# Patient Record
Sex: MALE | Race: WHITE | NOT HISPANIC OR LATINO | URBAN - METROPOLITAN AREA
[De-identification: names, ages, dates, MRNs, and addresses within clinical notes are randomized per-mention and may not be internally consistent; named-entity substitution may affect disease eponyms.]

---

## 2018-05-11 ENCOUNTER — EMERGENCY (EMERGENCY)
Facility: HOSPITAL | Age: 63
LOS: 1 days | Discharge: HOME | End: 2018-05-11
Attending: EMERGENCY MEDICINE | Admitting: EMERGENCY MEDICINE
Payer: COMMERCIAL

## 2018-05-11 VITALS
HEART RATE: 83 BPM | DIASTOLIC BLOOD PRESSURE: 91 MMHG | OXYGEN SATURATION: 96 % | TEMPERATURE: 98 F | SYSTOLIC BLOOD PRESSURE: 151 MMHG | RESPIRATION RATE: 18 BRPM

## 2018-05-11 VITALS — WEIGHT: 188.5 LBS

## 2018-05-11 PROBLEM — Z00.00 ENCOUNTER FOR PREVENTIVE HEALTH EXAMINATION: Status: ACTIVE | Noted: 2018-05-11

## 2018-05-11 LAB
ALBUMIN SERPL ELPH-MCNC: 4.5 G/DL — SIGNIFICANT CHANGE UP (ref 3.3–5)
ALP SERPL-CCNC: 65 U/L — SIGNIFICANT CHANGE UP (ref 40–120)
ALT FLD-CCNC: 25 U/L — SIGNIFICANT CHANGE UP (ref 10–45)
ANION GAP SERPL CALC-SCNC: 14 MMOL/L — SIGNIFICANT CHANGE UP (ref 5–17)
APTT BLD: 29.6 SEC — SIGNIFICANT CHANGE UP (ref 27.5–37.4)
AST SERPL-CCNC: 26 U/L — SIGNIFICANT CHANGE UP (ref 10–40)
BASOPHILS # BLD AUTO: 0.1 K/UL — SIGNIFICANT CHANGE UP (ref 0–0.2)
BASOPHILS NFR BLD AUTO: 1.2 % — SIGNIFICANT CHANGE UP (ref 0–2)
BILIRUB SERPL-MCNC: 1 MG/DL — SIGNIFICANT CHANGE UP (ref 0.2–1.2)
BUN SERPL-MCNC: 21 MG/DL — SIGNIFICANT CHANGE UP (ref 7–23)
CALCIUM SERPL-MCNC: 9.6 MG/DL — SIGNIFICANT CHANGE UP (ref 8.4–10.5)
CHLORIDE SERPL-SCNC: 103 MMOL/L — SIGNIFICANT CHANGE UP (ref 96–108)
CK SERPL-CCNC: 167 U/L — SIGNIFICANT CHANGE UP (ref 30–200)
CO2 SERPL-SCNC: 24 MMOL/L — SIGNIFICANT CHANGE UP (ref 22–31)
CREAT SERPL-MCNC: 1.06 MG/DL — SIGNIFICANT CHANGE UP (ref 0.5–1.3)
EOSINOPHIL # BLD AUTO: 0.2 K/UL — SIGNIFICANT CHANGE UP (ref 0–0.5)
EOSINOPHIL NFR BLD AUTO: 2 % — SIGNIFICANT CHANGE UP (ref 0–6)
GLUCOSE BLDC GLUCOMTR-MCNC: 110 MG/DL — HIGH (ref 70–99)
GLUCOSE SERPL-MCNC: 100 MG/DL — HIGH (ref 70–99)
HCT VFR BLD CALC: 48.1 % — SIGNIFICANT CHANGE UP (ref 39–50)
HGB BLD-MCNC: 16.3 G/DL — SIGNIFICANT CHANGE UP (ref 13–17)
INR BLD: 0.99 RATIO — SIGNIFICANT CHANGE UP (ref 0.88–1.16)
LYMPHOCYTES # BLD AUTO: 3.2 K/UL — SIGNIFICANT CHANGE UP (ref 1–3.3)
LYMPHOCYTES # BLD AUTO: 33.2 % — SIGNIFICANT CHANGE UP (ref 13–44)
MCHC RBC-ENTMCNC: 30.5 PG — SIGNIFICANT CHANGE UP (ref 27–34)
MCHC RBC-ENTMCNC: 34 GM/DL — SIGNIFICANT CHANGE UP (ref 32–36)
MCV RBC AUTO: 89.8 FL — SIGNIFICANT CHANGE UP (ref 80–100)
MONOCYTES # BLD AUTO: 0.7 K/UL — SIGNIFICANT CHANGE UP (ref 0–0.9)
MONOCYTES NFR BLD AUTO: 7.2 % — SIGNIFICANT CHANGE UP (ref 2–14)
NEUTROPHILS # BLD AUTO: 5.5 K/UL — SIGNIFICANT CHANGE UP (ref 1.8–7.4)
NEUTROPHILS NFR BLD AUTO: 56.5 % — SIGNIFICANT CHANGE UP (ref 43–77)
PLATELET # BLD AUTO: 151 K/UL — SIGNIFICANT CHANGE UP (ref 150–400)
POTASSIUM SERPL-MCNC: 4.7 MMOL/L — SIGNIFICANT CHANGE UP (ref 3.5–5.3)
POTASSIUM SERPL-SCNC: 4.7 MMOL/L — SIGNIFICANT CHANGE UP (ref 3.5–5.3)
PROT SERPL-MCNC: 7.4 G/DL — SIGNIFICANT CHANGE UP (ref 6–8.3)
PROTHROM AB SERPL-ACNC: 10.8 SEC — SIGNIFICANT CHANGE UP (ref 9.8–12.7)
RBC # BLD: 5.36 M/UL — SIGNIFICANT CHANGE UP (ref 4.2–5.8)
RBC # FLD: 12.1 % — SIGNIFICANT CHANGE UP (ref 10.3–14.5)
SODIUM SERPL-SCNC: 141 MMOL/L — SIGNIFICANT CHANGE UP (ref 135–145)
TROPONIN T SERPL-MCNC: <0.01 NG/ML — SIGNIFICANT CHANGE UP (ref 0–0.06)
WBC # BLD: 9.8 K/UL — SIGNIFICANT CHANGE UP (ref 3.8–10.5)
WBC # FLD AUTO: 9.8 K/UL — SIGNIFICANT CHANGE UP (ref 3.8–10.5)

## 2018-05-11 PROCEDURE — 80053 COMPREHEN METABOLIC PANEL: CPT

## 2018-05-11 PROCEDURE — 70450 CT HEAD/BRAIN W/O DYE: CPT

## 2018-05-11 PROCEDURE — 82962 GLUCOSE BLOOD TEST: CPT

## 2018-05-11 PROCEDURE — 85730 THROMBOPLASTIN TIME PARTIAL: CPT

## 2018-05-11 PROCEDURE — 85610 PROTHROMBIN TIME: CPT

## 2018-05-11 PROCEDURE — 99285 EMERGENCY DEPT VISIT HI MDM: CPT | Mod: 25

## 2018-05-11 PROCEDURE — 93005 ELECTROCARDIOGRAM TRACING: CPT

## 2018-05-11 PROCEDURE — 93010 ELECTROCARDIOGRAM REPORT: CPT

## 2018-05-11 PROCEDURE — 85027 COMPLETE CBC AUTOMATED: CPT

## 2018-05-11 PROCEDURE — 84484 ASSAY OF TROPONIN QUANT: CPT

## 2018-05-11 PROCEDURE — 99284 EMERGENCY DEPT VISIT MOD MDM: CPT | Mod: 25

## 2018-05-11 PROCEDURE — 70450 CT HEAD/BRAIN W/O DYE: CPT | Mod: 26

## 2018-05-11 PROCEDURE — 82550 ASSAY OF CK (CPK): CPT

## 2018-05-11 NOTE — ED PROVIDER NOTE - MEDICAL DECISION MAKING DETAILS
Jonathan Weil, PGY1 - Code stroke called based on history of unilateral numbness with dysarthria. No deficits on exam. CT head negative. Neurology at bedside to evaluate - agree with no focal deficits. Further history reveals history of similar symptoms for many years with no h/o cerebral ischemia.

## 2018-05-11 NOTE — ED PROVIDER NOTE - OBJECTIVE STATEMENT
62M presenting with numbness for 3 hours. Patient reports abrupt onset of numbness and tingling in the L face and L leg at approximately 2130 this evening. Associated w/ dysarthria. No weakness/headache/vision change/gait change. Hx similar symptoms for many years. Patient is not on asa or anticoagulants.

## 2018-05-11 NOTE — ED ADULT NURSE NOTE - OBJECTIVE STATEMENT
Received patient awake and alert x 4, presenting to the ED with numbness for 3 hours, states the numbness starting abruptly at 2130 this evening, numbness to left face and left leg along with dysarthria. States he had no weakness, no headache, no visual changes. No facial droop noted. Upper and lower extremities strong with coherent speech. Code stroke initiated, MD and neurology at bedside with no focal deficits noted. Patient states he has had similar symptoms for many years, family at bedside, safety and comfort measures maintained, will continue to monitor.

## 2018-05-11 NOTE — ED PROVIDER NOTE - PROGRESS NOTE DETAILS
Jonathan Weil, PGY1 - neuro recommends no intervention at this time. If rest of evaluation is negative patient can f/u outpatient.

## 2018-05-11 NOTE — CONSULT NOTE ADULT - SUBJECTIVE AND OBJECTIVE BOX
HUMBERTO Ball is a 62y old  Male who presents with a chief complaint of left sided numbness    HPI:  64 y/o [place] man with hx of HLD, former smoker p/w sudden onset of left sided numbness and slurred speech at home at around 9:30pm. Pt was at home alone at the time. Lasted about 20 minutes improved, but pt states he did not get back to 100%. Then at about 10:30pm the symptoms came back again. That when he called his daughter and they decided to come to the ED. Code stroke was called. Pt states that he gets these symptoms on and off since he was a teenager and was told in the past that it could be related to stress. Tonight he felt that he was stronger than usual. He denies any HA, weakness, chest pain, SOB, palpitations. Symptoms have now completely resolved. Daughter agrees.     MEDICATIONS  (STANDING):    MEDICATIONS  (PRN):    PAST MEDICAL & SURGICAL HISTORY:  HLD    FAMILY HISTORY: No significant    Allergies    No Known Allergies    Intolerances    SHx - Former smoker for 40 yrs, quit 10 yrs ago, No ETOH, No drug abuse      REVIEW OF SYSTEMS:    Constitutional: No fever, weight loss, or fatigue  Eyes: No eye pain, visual disturbances, or discharge  ENMT:  No difficulty hearing, tinnitus, vertigo; No sinus or throat pain  Neck: No pain or stiffness  Respiratory: No cough, wheezing, or shortness of breath  Cardiovascular: No chest pain, palpitations, or leg swelling  Gastrointestinal: No abdominal pain, nausea, vomiting, diarrhea or constipation.   Genitourinary: No dysuria, frequency, hematuria, or incontinence  Neurological: As per HPI  Skin: No itching, burning, rashes, or lesions   Endocrine: No heat or cold intolerance; No hair loss  Musculoskeletal: No joint pain or swelling; No muscle, back, or extremity pain  Psychiatric: No depression, anxiety, mood swings, or difficulty sleeping  Heme/Lymph: No easy bruising or bleeding; No enlarged glands      Vital Signs Last 24 Hrs  T(C): 37 (11 May 2018 01:07), Max: 37 (11 May 2018 01:07)  T(F): 98.6 (11 May 2018 01:07), Max: 98.6 (11 May 2018 01:07)  HR: 79 (11 May 2018 01:18) (79 - 83)  BP: 115/86 (11 May 2018 01:18) (115/86 - 151/91)  BP(mean): --  RR: 18 (11 May 2018 01:18) (16 - 18)  SpO2: 98% (11 May 2018 01:18) (96% - 98%)    General Exam:   General appearance: No acute distress    Cardiac:  Pulm:                 Neurological Exam:  Mental Status: Orientated to self, date and place.  Attention intact.  No dysarthria. Speech fluent. naming intact  Cranial Nerves:   PERRL, EOMI, VFF, no nystagmus.    CN V1-3 intact to light touch .  No facial asymmetry.  Hearing intact bilaterally.  Tongue  midline.  Sternocleidomastoid and Trapezius intact bilaterally.    Motor:   Tone: normal.                  Strength:     [] Upper extremity                      Delt       Bicep    Tricep                                                  R         5/5        5/5        5/5       5/5                                               L          5/5        5/5        5/5       5/5  [] Lower extremity                       HF          KE          KF        DF         PF                                               R        5/5        5/5        5/5       5/5       5/5                                               L         5/5        5/5       5/5       5/5        5/5             Dysmetria: None to finger-nose-finger or heel-shin-heel  No truncal ataxia.    Tremor: No resting, postural or action tremor.  No myoclonus.    Sensation: intact to light touch, pinprick,     Gait: normal.      Other:    05-11    141  |  103  |  21  ----------------------------<  100<H>  4.7   |  24  |  1.06    Ca    9.6      11 May 2018 01:22    TPro  7.4  /  Alb  4.5  /  TBili  1.0  /  DBili  x   /  AST  26  /  ALT  25  /  AlkPhos  65  05-11                            16.3   9.8   )-----------( 151      ( 11 May 2018 01:22 )             48.1       Radiology    CT: < from: CT Brain Stroke Protocol (05.11.18 @ 01:21) >  IMPRESSION:   No acute hemorrhage, mass effect, or shift. No CT evidence of an acute   territorial infarct. Small chronic left parietal lobe infarcts.

## 2018-05-11 NOTE — CONSULT NOTE ADULT - ASSESSMENT
64 y/o man with hx of smoking and HLD p/w left sided numbness and dysarthria since 9:30pm which waxed and waned until now. Symptoms have now currently fully resolved, NIHSS 0, MRS 0. Pt reports personal hx of similar events in the past.     Impression: Possible TIA    Plan: Pt to follow up with Dr. Darnell as outpatient [phone], start daily Aspirin. 62 y/o man with hx of smoking and HLD p/w left sided numbness and dysarthria since 9:30pm which waxed and waned until now. Symptoms have now currently fully resolved, NIHSS 0, MRS 0. Pt reports personal hx of similar events in the past. Pt not TPA candidate given that symptoms completely resolved.     Impression: Possible TIA    Plan: Pt to follow up with Dr. Darnell as outpatient [phone], start daily Aspirin.

## 2018-05-11 NOTE — ED PROVIDER NOTE - ATTENDING CONTRIBUTION TO CARE
62 yom pmhx hld, former smoker, presents with numbness/ tingling to left side of face  associated w slurred speech starting at approx 930 pm. states sxs lasted 30 min but still states does not feel 100 percent back to normal. no headache, no cp or sob. pt states the sxs returned again at 1030 pm and have not mostly resolved but still does not feel "back to normal."     ROS:   constitutional - no fever, no chills  eyes - no visual changes, no redness  eent - no sore throat, no nasal congestion  cvs - no chest pain, no leg swelling  resp - no shortness of breath, no cough  gi - no abdominal pain, no vomiting, no diarrhea  gu - no dysuria, no hematuria  msk - no acute back pain, no joint swelling  skin - no rashes, no jaundice  neuro - no headache, no focal weakness  psych - no acute mental health issue     Physical Exam:   constitutional - well appearing, awake and alert, oriented x3  head - no external evidence of trauma  cvs - rrr, no murmurs, no peripheral edema  resp - breath sounds clear and equal bilat  gi - abdomen soft and nontender, no rigidity, guarding or rebound, bowel sounds present  msk - moving all extremities spontaneously  neuro - alert and oriented x3, no focal deficits, CNs 2-12 grossly intact, no pronator drift. reports subjective mild left facial numbness. strength 5/5 in all ext. gait stable  skin- no jaundice, warm and dry  psych - mood and affect wnl, no apparent risk to self or others     seen by neuro in ed - upon further questioning pt does state he has had similar sxs recurrently since teenage years. possible tia - as per neuro recs will start daily baby asa and f/u w neuro as outpt. sxs completely resolved inED. additional verbal instructions regarding diagnosis, return precautions and follow up plan given to pt and/or family. EDITH King MD

## 2021-02-20 ENCOUNTER — EMERGENCY (EMERGENCY)
Facility: HOSPITAL | Age: 66
LOS: 1 days | Discharge: HOME | End: 2021-02-20
Attending: EMERGENCY MEDICINE
Payer: COMMERCIAL

## 2021-02-20 VITALS
HEART RATE: 78 BPM | HEIGHT: 67 IN | DIASTOLIC BLOOD PRESSURE: 85 MMHG | OXYGEN SATURATION: 98 % | SYSTOLIC BLOOD PRESSURE: 145 MMHG | RESPIRATION RATE: 18 BRPM | TEMPERATURE: 99 F | WEIGHT: 175.05 LBS

## 2021-02-20 PROBLEM — E78.5 HYPERLIPIDEMIA, UNSPECIFIED: Chronic | Status: ACTIVE | Noted: 2018-05-14

## 2021-02-20 LAB
ALBUMIN SERPL ELPH-MCNC: 4.5 G/DL — SIGNIFICANT CHANGE UP (ref 3.3–5)
ALP SERPL-CCNC: 73 U/L — SIGNIFICANT CHANGE UP (ref 40–120)
ALT FLD-CCNC: 17 U/L — SIGNIFICANT CHANGE UP (ref 10–45)
ANION GAP SERPL CALC-SCNC: 11 MMOL/L — SIGNIFICANT CHANGE UP (ref 5–17)
APTT BLD: 26.7 SEC — LOW (ref 27.5–35.5)
AST SERPL-CCNC: 23 U/L — SIGNIFICANT CHANGE UP (ref 10–40)
BASOPHILS # BLD AUTO: 0.06 K/UL — SIGNIFICANT CHANGE UP (ref 0–0.2)
BASOPHILS NFR BLD AUTO: 0.7 % — SIGNIFICANT CHANGE UP (ref 0–2)
BILIRUB SERPL-MCNC: 0.7 MG/DL — SIGNIFICANT CHANGE UP (ref 0.2–1.2)
BUN SERPL-MCNC: 20 MG/DL — SIGNIFICANT CHANGE UP (ref 7–23)
CALCIUM SERPL-MCNC: 9.8 MG/DL — SIGNIFICANT CHANGE UP (ref 8.4–10.5)
CHLORIDE SERPL-SCNC: 106 MMOL/L — SIGNIFICANT CHANGE UP (ref 96–108)
CO2 SERPL-SCNC: 27 MMOL/L — SIGNIFICANT CHANGE UP (ref 22–31)
CREAT SERPL-MCNC: 1.07 MG/DL — SIGNIFICANT CHANGE UP (ref 0.5–1.3)
EOSINOPHIL # BLD AUTO: 0.12 K/UL — SIGNIFICANT CHANGE UP (ref 0–0.5)
EOSINOPHIL NFR BLD AUTO: 1.4 % — SIGNIFICANT CHANGE UP (ref 0–6)
GLUCOSE SERPL-MCNC: 113 MG/DL — HIGH (ref 70–99)
HCT VFR BLD CALC: 47.3 % — SIGNIFICANT CHANGE UP (ref 39–50)
HGB BLD-MCNC: 16.1 G/DL — SIGNIFICANT CHANGE UP (ref 13–17)
IMM GRANULOCYTES NFR BLD AUTO: 0.3 % — SIGNIFICANT CHANGE UP (ref 0–1.5)
INR BLD: 0.99 RATIO — SIGNIFICANT CHANGE UP (ref 0.88–1.16)
LYMPHOCYTES # BLD AUTO: 2.2 K/UL — SIGNIFICANT CHANGE UP (ref 1–3.3)
LYMPHOCYTES # BLD AUTO: 25.5 % — SIGNIFICANT CHANGE UP (ref 13–44)
MAGNESIUM SERPL-MCNC: 2.1 MG/DL — SIGNIFICANT CHANGE UP (ref 1.6–2.6)
MCHC RBC-ENTMCNC: 29.9 PG — SIGNIFICANT CHANGE UP (ref 27–34)
MCHC RBC-ENTMCNC: 34 GM/DL — SIGNIFICANT CHANGE UP (ref 32–36)
MCV RBC AUTO: 87.9 FL — SIGNIFICANT CHANGE UP (ref 80–100)
MONOCYTES # BLD AUTO: 0.5 K/UL — SIGNIFICANT CHANGE UP (ref 0–0.9)
MONOCYTES NFR BLD AUTO: 5.8 % — SIGNIFICANT CHANGE UP (ref 2–14)
NEUTROPHILS # BLD AUTO: 5.73 K/UL — SIGNIFICANT CHANGE UP (ref 1.8–7.4)
NEUTROPHILS NFR BLD AUTO: 66.3 % — SIGNIFICANT CHANGE UP (ref 43–77)
NRBC # BLD: 0 /100 WBCS — SIGNIFICANT CHANGE UP (ref 0–0)
PLATELET # BLD AUTO: 145 K/UL — LOW (ref 150–400)
POTASSIUM SERPL-MCNC: 4 MMOL/L — SIGNIFICANT CHANGE UP (ref 3.5–5.3)
POTASSIUM SERPL-SCNC: 4 MMOL/L — SIGNIFICANT CHANGE UP (ref 3.5–5.3)
PROT SERPL-MCNC: 7.6 G/DL — SIGNIFICANT CHANGE UP (ref 6–8.3)
PROTHROM AB SERPL-ACNC: 11.9 SEC — SIGNIFICANT CHANGE UP (ref 10.6–13.6)
RBC # BLD: 5.38 M/UL — SIGNIFICANT CHANGE UP (ref 4.2–5.8)
RBC # FLD: 13.8 % — SIGNIFICANT CHANGE UP (ref 10.3–14.5)
SARS-COV-2 RNA SPEC QL NAA+PROBE: SIGNIFICANT CHANGE UP
SODIUM SERPL-SCNC: 144 MMOL/L — SIGNIFICANT CHANGE UP (ref 135–145)
WBC # BLD: 8.64 K/UL — SIGNIFICANT CHANGE UP (ref 3.8–10.5)
WBC # FLD AUTO: 8.64 K/UL — SIGNIFICANT CHANGE UP (ref 3.8–10.5)

## 2021-02-20 PROCEDURE — 70551 MRI BRAIN STEM W/O DYE: CPT | Mod: 26,MG

## 2021-02-20 PROCEDURE — G1004: CPT

## 2021-02-20 PROCEDURE — 70498 CT ANGIOGRAPHY NECK: CPT | Mod: 26,MA

## 2021-02-20 PROCEDURE — 70496 CT ANGIOGRAPHY HEAD: CPT | Mod: 26,MA

## 2021-02-20 PROCEDURE — 99218: CPT

## 2021-02-20 PROCEDURE — 93010 ELECTROCARDIOGRAM REPORT: CPT

## 2021-02-20 RX ORDER — MECLIZINE HCL 12.5 MG
25 TABLET ORAL ONCE
Refills: 0 | Status: COMPLETED | OUTPATIENT
Start: 2021-02-20 | End: 2021-02-20

## 2021-02-20 RX ORDER — SODIUM CHLORIDE 9 MG/ML
3 INJECTION INTRAMUSCULAR; INTRAVENOUS; SUBCUTANEOUS EVERY 8 HOURS
Refills: 0 | Status: DISCONTINUED | OUTPATIENT
Start: 2021-02-20 | End: 2021-02-23

## 2021-02-20 RX ORDER — ATORVASTATIN CALCIUM 80 MG/1
40 TABLET, FILM COATED ORAL AT BEDTIME
Refills: 0 | Status: DISCONTINUED | OUTPATIENT
Start: 2021-02-20 | End: 2021-02-23

## 2021-02-20 RX ADMIN — ATORVASTATIN CALCIUM 40 MILLIGRAM(S): 80 TABLET, FILM COATED ORAL at 19:44

## 2021-02-20 RX ADMIN — Medication 25 MILLIGRAM(S): at 09:30

## 2021-02-20 RX ADMIN — SODIUM CHLORIDE 3 MILLILITER(S): 9 INJECTION INTRAMUSCULAR; INTRAVENOUS; SUBCUTANEOUS at 22:41
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
## 2021-02-20 NOTE — ED ADULT NURSE NOTE - OBJECTIVE STATEMENT
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

## 2021-02-20 NOTE — ED CDU PROVIDER INITIAL DAY NOTE - DETAILS
vital signs q4h, neuro checks q4h, MRI, TTE, neuro consult, frequent re-evaluations  case d/w Dr. Kasper
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
## 2021-02-20 NOTE — ED ADULT NURSE REASSESSMENT NOTE - NS ED NURSE REASSESS COMMENT FT1
Care assumed of patient at this time. Patient is at CT scan
Patient returns from CT. Patient updated to plan of care.
Patient updated to plan of care. Patient's family member updated to plan of care via telephone by doctor Farrar
Received pt from JAY Cunningham , received pt alert and responsive, oriented x4, denies any respiratory distress, SOB, or difficulty breathing. Pt transferred to CDU for MRI and TTE. Pt neuro exam intact no deficits noted. Pt denies pain or discomfort at this time. On telemetry pt is SR hr: 70's.  IV in place, patent and free of signs of infiltration, V/S stable, pt afebrile. Pt educated on unit and unit rules, instructed patient to notify RN of any needed assistance, Pt verbalizes understanding, Call bell placed within reach. Safety maintained. Will continue to monitor.

## 2021-02-20 NOTE — ED CDU PROVIDER SUBSEQUENT DAY NOTE - PHYSICAL EXAMINATION
CONSTITUTIONAL: Patient is awake, alert and oriented x 3. Patient is well appearing and in no acute distress  HEAD: NCAT  NECK: supple, FROM  LUNGS: CTA B/  HEART: RRR  ABDOMEN: Soft nd/nt, no rebound or guarding  EXTREMITY: no edema or calf tenderness b/l, FROM upper and lower ext b/l  SKIN: with no rash or lesions  NEURO: Cn3-12 grossly intact. Strength5/5UE/LE.NmlSensation.Gait normal

## 2021-02-20 NOTE — ED ADULT TRIAGE NOTE - CHIEF COMPLAINT QUOTE
pain in right side of neck and feeling unbalanced when walking "since yesterday afternoon when walking up stairs." "feels better this morning." pain in back of neck and feeling unbalanced when walking "since yesterday afternoon when walking up stairs." "feels better this morning."  BEFAST negative

## 2021-02-20 NOTE — ED ADULT NURSE NOTE - NS_ED_NURSE_TEACHING_TOPIC_ED_A_ED
PROVIDER:[TOKEN:[[id number]]] PROVIDER:[TOKEN:[[id number]]],PROVIDER:[TOKEN:[[id number]],FOLLOWUP:[1 week]],PROVIDER:[TOKEN:[[id number]],FOLLOWUP:[1 week]] signs & symptoms of a stroke, call 911-->return to ER, follow-up care with pmd, neurologist & cardiologist

## 2021-02-20 NOTE — ED CDU PROVIDER SUBSEQUENT DAY NOTE - PROGRESS NOTE DETAILS
CDU NOTE CURLY Lang: VSS NAD. Patient is resting comfortably and is without any complaints. NSR on tele. Neuro exam remains unchanged. MRI resulted as "Acute left superior cerebellar infarct. No hemorrhagic transformation/conversion" which is consistent w/ CT findings. Pending Neuro eval and TTE in am CDU PROGRESS NOTE CURLY WILKES: Pt resting comfortably, feeling well without complaint. NAD, VSS. No events on telemetry. Patient still feeling some generalized unsteadiness. Patient pending neurology attending evaluation. Patient had echo at bedside. Dr. Darnell at bedside, stating patient is stable for discharge with ASA and Plavix x3 months, follow-up with Dr. Darnell outpatient. Stating patient can discuss obtaining PT as outpatient, none required here as patient feels stable and wishing to go home. Patient pending results of TTE at this time prior to dispo. -Aarti Curtis PA-C TTE found to have PFO. Dr. Darnell contacted and informed, stating that location of stroke is unlikely to have been caused by PFO m/p from atherosclerosis. Stating patient stable for d/c at this time. Plavix sent to the pharmacy and informed to take plavix and aspirin for 3 months, as well as importance of follow-up with neurology and with a cardiologist. Will provide patient with cardiology referral as well. D/w Dr. Contreras patient stable for d/c at this time. -Aarti Curtis PA-C

## 2021-02-20 NOTE — ED CDU PROVIDER DISPOSITION NOTE - NSFOLLOWUPINSTRUCTIONS_ED_ALL_ED_FT
1. Please follow up with your PCP in the next 2-3 days to discuss ED visit   2. Stay hydrated and continue all at home medications   3. Follow up with Neurology DrJohnson *** within ***. Please see above for follow up information  4. Please see stroke information below  5. Return to ED for change of symptoms including recurrent unsteadiness, weakness, numbness, change in speech, vision or kandi and any other concerns     ***Stroke Prevention***  Some medical conditions and behaviors are associated with a higher chance of having a stroke. You can help prevent a stroke by making nutrition, lifestyle, and other changes, including managing any medical conditions you may have.  WHAT NUTRITION CHANGES CAN BE MADE?  Eat healthy foods. You can do this by:  · Choosing foods high in fiber, such as fresh fruits and vegetables and whole grains.  · Eating at least 5 or more servings of fruits and vegetables a day. Try to fill half of your plate at each meal with fruits and vegetables.  · Choosing lean protein foods, such as lean cuts of meat, poultry without skin, fish, tofu, beans, and nuts.  · Eating low-fat dairy products.  · Avoiding foods that are high in salt (sodium). This can help lower blood pressure.  · Avoiding foods that have saturated fat, trans fat, and cholesterol. This can help prevent high cholesterol.  · Avoiding processed and premade foods.  Follow your health care provider's specific guidelines for losing weight, controlling high blood pressure (hypertension), lowering high cholesterol, and managing diabetes. These may include:  · Reducing your daily calorie intake.  · Limiting your daily sodium intake to 1,500 milligrams (mg).  · Using only healthy fats for cooking, such as olive oil, canola oil, or sunflower oil.  · Counting your daily carbohydrate intake.  WHAT LIFESTYLE CHANGES CAN BE MADE?  Maintain a healthy weight. Talk to your health care provider about your ideal weight.  Get at least 30 minutes of moderate physical activity at least 5 days a week. Moderate activity includes brisk walking, biking, and swimming.  Do not use any products that contain nicotine or tobacco, such as cigarettes and e-cigarettes. If you need  help quitting, ask your health care provider. It may also be helpful to avoid exposure to secondhand smoke.  Limit alcohol intake to no more than 1 drink a day for nonpregnant women and 2 drinks a day for men. One  drink equals 12 oz of beer, 5 oz of wine, or 1½ oz of hard liquor.  Stop any illegal drug use.      Avoid taking birth control pills. Talk to your health care provider about the risks of taking birth control pills if:  · You are over 35 years old.  · You smoke.  · You get migraines.  · You have ever had a blood clot.  WHAT OTHER CHANGES CAN BE MADE?  Manage your cholesterol levels.  · Eating a healthy diet is important for preventing high cholesterol. If cholesterol cannot be managed  through diet alone, you may also need to take medicines.  · Take any prescribed medicines to control your cholesterol as told by your health care provider.  Manage your diabetes.  · Eating a healthy diet and exercising regularly are important parts of managing your blood sugar. If your  blood sugar cannot be managed through diet and exercise, you may need to take medicines.  · Take any prescribed medicines to control your diabetes as told by your health care provider.  Control your hypertension.  · To reduce your risk of stroke, try to keep your blood pressure below 130/80.  · Eating a healthy diet and exercising regularly are an important part of controlling your blood pressure. If  your blood pressure cannot be managed through diet and exercise, you may need to take medicines.  · Take any prescribed medicines to control hypertension as told by your health care provider.  · Ask your health care provider if you should monitor your blood pressure at home.  · Have your blood pressure checked every year, even if your blood pressure is normal. Blood pressure  increases with age and some medical conditions.  Get evaluated for sleep disorders (sleep apnea). Talk to your health care provider about getting a sleep evaluation if you snore a lot or have excessive sleepiness.  Take over-the-counter and prescription medicines only as told by your health care provider. Aspirin or blood thinners (antiplatelets or anticoagulants) may be recommended to reduce your risk of forming blood clots that can lead to stroke.  Make sure that any other medical conditions you have, such as atrial fibrillation or atherosclerosis, are  managed.  WHAT ARE THE WARNING SIGNS OF A STROKE?  The warning signs of a stroke can be easily remembered as BEFAST.  B is for balance. Signs include:  · Dizziness.  · Loss of balance or coordination.  · Sudden trouble walking.  E is for eyes. Signs include:  · A sudden change in vision.  · Trouble seeing.  F is for face. Signs include:  · Sudden weakness or numbness of the face.  · The face or eyelid drooping to one side.  A is for arms. Signs include:  · Sudden weakness or numbness of the arm, usually on one side of the body.  S is for speech. Signs include:  · Trouble speaking (aphasia).  · Trouble understanding.  T is for time.      · These symptoms may represent a serious problem that is an emergency. Do not wait to see if the symptoms will go away. Get medical help right away. Call your local emergency services (911 in the [place] Do not drive yourself to the hospital.  Other signs of stroke may include:  · A sudden, severe headache with no known cause.  · Nausea or vomiting.  · Seizure.    WHERE TO FIND MORE INFORMATION  For more information, visit:  American Stroke Association: www.strokeassociation.org  National Stroke Association: www.stroke.org  SUMMARY  You can prevent a stroke by eating healthy, exercising, not smoking, limiting alcohol intake, and managing  any medical conditions you may have.  Do not use any products that contain nicotine or tobacco, such as cigarettes and e-cigarettes. If you need  help quitting, ask your health care provider. It may also be helpful to avoid exposure to secondhand smoke.  Remember BEFAST for warning signs of stroke. Get help right away if you or a loved one has any of these  signs.  ADDITIONAL NOTES AND INSTRUCTIONS  Please follow up with your Primary MD in 24-48 hr.  Seek immediate medical care for any new/worsening signs or symptoms. 1. Please follow up with your PCP in the next 2-3 days to discuss ED visit   2. Stay hydrated and continue all at home medications START aspirin again 81mg once daily and START plavix one tablet once daily. You will need to take these medications daily for 3 months  3. Follow up with Neurology Dr. Darnell ([address] [facility] [address], [address], [facility] [address], [phone]) within a week for reevaluation and continued treatment.   4. Please see stroke information below  5. You will also need to follow-up with a cardiologist for your patent foramen ovale. A copy of your echo was provided to you for your visit. A referral was made through our referrals system for you to obtain an appointment with a cardiologist. A coordinator will be calling you to schedule this appointment. If you do not receive a call within 72 hours to schedule this appointment please contact the emergency department. If you have a cardiologist you may follow-up with the one of your choosing as well.   5. Return to ED for change of symptoms including recurrent unsteadiness, weakness, numbness, change in speech, vision or kandi and any other concerns     ***Stroke Prevention***  Some medical conditions and behaviors are associated with a higher chance of having a stroke. You can help prevent a stroke by making nutrition, lifestyle, and other changes, including managing any medical conditions you may have.  WHAT NUTRITION CHANGES CAN BE MADE?  Eat healthy foods. You can do this by:  · Choosing foods high in fiber, such as fresh fruits and vegetables and whole grains.  · Eating at least 5 or more servings of fruits and vegetables a day. Try to fill half of your plate at each meal with fruits and vegetables.  · Choosing lean protein foods, such as lean cuts of meat, poultry without skin, fish, tofu, beans, and nuts.  · Eating low-fat dairy products.  · Avoiding foods that are high in salt (sodium). This can help lower blood pressure.  · Avoiding foods that have saturated fat, trans fat, and cholesterol. This can help prevent high cholesterol.  · Avoiding processed and premade foods.  Follow your health care provider's specific guidelines for losing weight, controlling high blood pressure (hypertension), lowering high cholesterol, and managing diabetes. These may include:  · Reducing your daily calorie intake.  · Limiting your daily sodium intake to 1,500 milligrams (mg).  · Using only healthy fats for cooking, such as olive oil, canola oil, or sunflower oil.  · Counting your daily carbohydrate intake.  WHAT LIFESTYLE CHANGES CAN BE MADE?  Maintain a healthy weight. Talk to your health care provider about your ideal weight.  Get at least 30 minutes of moderate physical activity at least 5 days a week. Moderate activity includes brisk walking, biking, and swimming.  Do not use any products that contain nicotine or tobacco, such as cigarettes and e-cigarettes. If you need  help quitting, ask your health care provider. It may also be helpful to avoid exposure to secondhand smoke.  Limit alcohol intake to no more than 1 drink a day for nonpregnant women and 2 drinks a day for men. One  drink equals 12 oz of beer, 5 oz of wine, or 1½ oz of hard liquor.  Stop any illegal drug use.      Avoid taking birth control pills. Talk to your health care provider about the risks of taking birth control pills if:  · You are over 35 years old.  · You smoke.  · You get migraines.  · You have ever had a blood clot.  WHAT OTHER CHANGES CAN BE MADE?  Manage your cholesterol levels.  · Eating a healthy diet is important for preventing high cholesterol. If cholesterol cannot be managed  through diet alone, you may also need to take medicines.  · Take any prescribed medicines to control your cholesterol as told by your health care provider.  Manage your diabetes.  · Eating a healthy diet and exercising regularly are important parts of managing your blood sugar. If your  blood sugar cannot be managed through diet and exercise, you may need to take medicines.  · Take any prescribed medicines to control your diabetes as told by your health care provider.  Control your hypertension.  · To reduce your risk of stroke, try to keep your blood pressure below 130/80.  · Eating a healthy diet and exercising regularly are an important part of controlling your blood pressure. If  your blood pressure cannot be managed through diet and exercise, you may need to take medicines.  · Take any prescribed medicines to control hypertension as told by your health care provider.  · Ask your health care provider if you should monitor your blood pressure at home.  · Have your blood pressure checked every year, even if your blood pressure is normal. Blood pressure  increases with age and some medical conditions.  Get evaluated for sleep disorders (sleep apnea). Talk to your health care provider about getting a sleep evaluation if you snore a lot or have excessive sleepiness.  Take over-the-counter and prescription medicines only as told by your health care provider. Aspirin or blood thinners (antiplatelets or anticoagulants) may be recommended to reduce your risk of forming blood clots that can lead to stroke.  Make sure that any other medical conditions you have, such as atrial fibrillation or atherosclerosis, are  managed.  WHAT ARE THE WARNING SIGNS OF A STROKE?  The warning signs of a stroke can be easily remembered as BEFAST.  B is for balance. Signs include:  · Dizziness.  · Loss of balance or coordination.  · Sudden trouble walking.  E is for eyes. Signs include:  · A sudden change in vision.  · Trouble seeing.  F is for face. Signs include:  · Sudden weakness or numbness of the face.  · The face or eyelid drooping to one side.  A is for arms. Signs include:  · Sudden weakness or numbness of the arm, usually on one side of the body.  S is for speech. Signs include:  · Trouble speaking (aphasia).  · Trouble understanding.  T is for time.      · These symptoms may represent a serious problem that is an emergency. Do not wait to see if the symptoms will go away. Get medical help right away. Call your local emergency services (911 in the [place] Do not drive yourself to the hospital.  Other signs of stroke may include:  · A sudden, severe headache with no known cause.  · Nausea or vomiting.  · Seizure.    WHERE TO FIND MORE INFORMATION  For more information, visit:  American Stroke Association: www.strokeassociation.org  National Stroke Association: www.stroke.org  SUMMARY  You can prevent a stroke by eating healthy, exercising, not smoking, limiting alcohol intake, and managing  any medical conditions you may have.  Do not use any products that contain nicotine or tobacco, such as cigarettes and e-cigarettes. If you need  help quitting, ask your health care provider. It may also be helpful to avoid exposure to secondhand smoke.  Remember BEFAST for warning signs of stroke. Get help right away if you or a loved one has any of these  signs.  ADDITIONAL NOTES AND INSTRUCTIONS  Please follow up with your Primary MD in 24-48 hr.  Seek immediate medical care for any new/worsening signs or symptoms.

## 2021-02-20 NOTE — ED CDU PROVIDER SUBSEQUENT DAY NOTE - MEDICAL DECISION MAKING DETAILS
Adeola Contreras MD care of the patient assumed at 9 AM on Feb 21 in the CDU, pt is a 65 M w/ hx of hld p/w unsteady gait started on Friday and then had persistent sx sat, seen in the ER and pt is due to get a colonoscopy- believed to be a screening given age, pt is nontoxic appearing, aaox3, intact finger to nose bilaterally, intact upper and lower extremity strength 5/5 w/ intact sensation in the bilateral arms/legs, CN2-12 intact, will discuss w/ neurology regarding asa plavix, suspect pt needs to be on antiplatelets and should post pone the screening colonoscopy given mri findings of infarct.

## 2021-02-20 NOTE — ED CDU PROVIDER INITIAL DAY NOTE - ATTENDING CONTRIBUTION TO CARE
RGUJRAL 64yo male hx HLD presents with imbalance and dizziness yesterday. States he was walking when he felt sudden imbalance, states symptoms have improved since but not resolved. Denies HA, complains of L side mild neck pain that is chronic. Denies any trauma, states he manipulates his own neck.   No change in vision, numbness, tingling, weakness.   On exam, Patient is awake, alert and oriented x 3.  Patient is well appearing and in no acute distress.  NCAT, PERRL, EOMI. + Horizontal nystagmus.   Neck is supple, No LAD.  Lungs are CTA B/L,+S1S2 no murmurs,  Abdomen:Soft nd/nt+bs no rebound or guarding.  Extremity no edema or calf tender.  Skin with no rash.  Neuro CN3-12 intact. Strength 5/5 in upper and lower extremities. Nml Sensation.Gait slight imbalance but improves with walking. no drift.   CT and Neuro consult appreciated. Admit to CDU for monitoring and MR Brain.

## 2021-02-20 NOTE — ED PROVIDER NOTE - NS_EDPROVIDERDISPOUSERTYPE_ED_A_ED
"Anesthesia Transfer of Care Note    Patient: Liliya Gaston    Procedure(s) Performed: Procedure(s) (LRB):   SECTION (N/A)    Patient location: Labor and Delivery    Anesthesia Type: epidural    Transport from OR: Transported from OR on room air with adequate spontaneous ventilation    Post pain: adequate analgesia    Post assessment: no apparent anesthetic complications and tolerated procedure well    Post vital signs: stable    Level of consciousness: awake, alert and oriented    Nausea/Vomiting: no nausea/vomiting    Complications: none    Transfer of care protocol was followed      Last vitals:   Visit Vitals  BP (!) 122/58   Pulse (!) 151   Temp 37.6 °C (99.6 °F) (Axillary)   Resp 18   Ht 5' 2" (1.575 m)   Wt (!) 142.6 kg (314 lb 6 oz)   LMP 2020 (LMP Unknown)   SpO2 97%   Breastfeeding No   BMI 57.50 kg/m²     "
Attending Attestation (For Attendings USE Only)...

## 2021-02-20 NOTE — ED CDU PROVIDER DISPOSITION NOTE - ATTENDING CONTRIBUTION TO CARE
care of the patient assumed at 9 AM on Feb 21 in the CDU, pt is a 65 M w/ hx of hld p/w unsteady gait started on Friday and then had persistent sx sat, seen in the ER and pt is due to get a colonoscopy- believed to be a screening given age, pt is nontoxic appearing, aaox3, intact finger to nose bilaterally, intact upper and lower extremity strength 5/5 w/ intact sensation in the bilateral arms/legs, CN2-12 intact, will discuss w/ neurology regarding asa plavix, suspect pt needs to be on antiplatelets and should post pone the screening colonoscopy given mri findings of infarct.

## 2021-02-20 NOTE — ED PROVIDER NOTE - ATTENDING CONTRIBUTION TO CARE
RGUJRAL 66yo male hx HLD presents with imbalance and dizziness yesterday. States he was walking when he felt sudden imbalance, states symptoms have improved since but not resolved. Denies HA, complains of L side mild neck pain that is chronic. Denies any trauma, states he manipulates his own neck.   No change in vision, numbness, tingling, weakness.   On exam, Patient is awake, alert and oriented x 3.  Patient is well appearing and in no acute distress.  NCAT, PERRL, EOMI. + Horizontal nystagmus.   Neck is supple, No LAD.  Lungs are CTA B/L,+S1S2 no murmurs,  Abdomen:Soft nd/nt+bs no rebound or guarding.  Extremity no edema or calf tender.  Skin with no rash.  Neuro CN3-12 intact. Strength 5/5 in upper and lower extremities. Nml Sensation.Gait slight imbalance but improves with walking. no drift.   Check labs, EKG, CT/CTA to eval.

## 2021-02-20 NOTE — ED CDU PROVIDER DISPOSITION NOTE - CARE PROVIDER_API CALL
Bautista Darnell (DO)  Neurology; Vascular Neurology  [address] [facility] [address], [address]  [place] [address]  Phone: [phone]  Fax: [phone]  Follow Up Time:

## 2021-02-20 NOTE — CONSULT NOTE ADULT - SUBJECTIVE AND OBJECTIVE BOX
MRN-[id number]  HPI: Patient is a 66 yo M Rt handed male with pmh of HLD presents to [facility] for dizziness and unsteady gait. Neuro consult for Left cerebellar stroke on CT head.   Patient states symptoms started yesterday evening and thought they would get better overnight. Patient woke this morning and stated his symptoms improved however, he felt unsteady still. Thus decided to go to the ED.   Patient takes ASA 81mg but was told by PCP to not take it for 2 weeks for colonoscopy. Patient has colonoscopy next friday. Patient denies headaches, numbness, weakness, and blurred vision.       PAST MEDICAL & SURGICAL HISTORY:  HLD (hyperlipidemia)    No significant past surgical history      FAMILY HISTORY:  No pertinent family history in first degree relatives      Social Hx:  Nonsmoker, no drug or alcohol use    Home Medications:    MEDICATIONS  (STANDING):  atorvastatin 40 milliGRAM(s) Oral at bedtime  sodium chloride 0.9% lock flush 3 milliLiter(s) IV Push every 8 hours      Allergies  No Known Allergies        REVIEW OF SYSTEMS  General: Denies fever and chills			  Respiratory and Thorax:	denies sob  Cardiovascular:	denies chest pain   Gastrointestinal: denies nausea and vomitting.   Neurological: Denies headaches. Mentions dizziness  	    ROS: Pertinent positives in HPI, all other ROS were reviewed and are negative.      Vital Signs Last 24 Hrs  T(C): 36.5 (20 Feb 2021 16:25), Max: 37.1 (20 Feb 2021 08:06)  T(F): 97.7 (20 Feb 2021 16:25), Max: 98.7 (20 Feb 2021 08:06)  HR: 60 (20 Feb 2021 16:25) (60 - 78)  BP: 135/74 (20 Feb 2021 16:25) (134/88 - 145/85)  BP(mean): --  RR: 18 (20 Feb 2021 16:25) (17 - 18)  SpO2: 98% (20 Feb 2021 16:25) (98% - 100%)    GENERAL EXAM:  Constitutional: awake and alert. NAD  HEENT: PERRLA, EOMI    NEUROLOGICAL EXAM:  MS: AAOX3, fluent, attends b/l; recent and remote memory intact; normal attention, language and fund of knowledge.     CN: VFF, EOMI, PERRL, no JUDAH, no APD,  V1-3 intact, no facial asymmetry, t/p midline.  Motor: Strength: 5/5 4x. Tone: normal. Bulk: normal.  Sensation: intact to light touch and pinprick intact in all extremities  Coordination: finger to nose intact b/l, Heel to shin intact b/l.   No pronation drift noted.   Extinction wnl   month and correctly named     NIHSS 0  mRS 0    Labs:   cbc                      16.1   8.64  )-----------( 145      ( 20 Feb 2021 09:26 )             47.3     [id number]    144  |  106  |  20  ----------------------------<  113<H>  4.0   |  27  |  1.07    Ca    9.8      20 Feb 2021 09:26  Mg     2.1     02-20    TPro  7.6  /  Alb  4.5  /  TBili  0.7  /  DBili  x   /  AST  23  /  ALT  17  /  AlkPhos  73  02-20    CoagsPT/INR - ( 20 Feb 2021 16:17 )   PT: 11.9 sec;   INR: 0.99 ratio         PTT - ( 20 Feb 2021 16:17 )  PTT:26.7 sec  Lipids  A1C  CardiacMarkers    LFTsLIVER FUNCTIONS - ( 20 Feb 2021 09:26 )  Alb: 4.5 g/dL / Pro: 7.6 g/dL / ALK PHOS: 73 U/L / ALT: 17 U/L / AST: 23 U/L / GGT: x           UA  CSF  Immunological Labs    Radiology:  -CT Head: Suspicion of a recent infarct in the left cerebellum not seen on the prior 5/11/2018 but given appearance findings are suggestive of a recent infarct. Old infarct in the left parietal territory identified on the prior CT study.  CTA NECK: Markedly diminutive left vertebral throughout its course from its origin at the level of the neck consistent with a congenitally small vessel. Minimal visualization of the left V4 segment likely reflux from the contralateral side. .  CTA COW:The basilar is somewhat diminutive with a fetal origin of the right PCA from the right internal carotid artery and a hypoplastic right P1. The left P1 is seen as well as a left posterior communicating artery. The left superior cerebellar is identified

## 2021-02-20 NOTE — ED CDU PROVIDER INITIAL DAY NOTE - PROGRESS NOTE DETAILS
CDU NOTE CURLY Lang: VSS NAD. Patient is resting comfortably and is without any complaints. Neuro exam remains unchanged. NSR on tele

## 2021-02-20 NOTE — ED ADULT NURSE NOTE - CHIEF COMPLAINT QUOTE
pain in back of neck and feeling unbalanced when walking "since yesterday afternoon when walking up stairs." "feels better this morning."  BEFAST negative

## 2021-02-20 NOTE — CONSULT NOTE ADULT - ATTENDING COMMENTS
VASCULAR NEUROLOGY ATTENDING  The patient is seen and examined the history and imaging are reviewed. I agree with the resident note unless otherwise noted. Patient with posterior circulation infarct. Presumed symptotic intracranial atherosclerotic disease. Would maintain on ASA Plavix for 3 months ten ASA. Statin. Outpatient cardiology follow-up to rule out PAF as competing mechanism.

## 2021-02-20 NOTE — ED CDU PROVIDER SUBSEQUENT DAY NOTE - HISTORY
CDU NOTE CURLY Lang: VSS NAD. Patient is resting comfortably and is without any complaints. He has been NSR on tele and neuro exam has remained unchanged with no focal deficits. pt had MRI, awaiting results. Pending Neuro reeval and TTE w/ bubble study in am. Will continue to monitor
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
## 2021-02-20 NOTE — ED ADULT NURSE NOTE - NSIMPLEMENTINTERV_GEN_ALL_ED
Implemented All Universal Safety Interventions:  [place] to call system. Call bell, personal items and telephone within reach. Instruct patient to call for assistance. Room bathroom lighting operational. Non-slip footwear when patient is off stretcher. Physically safe environment: no spills, clutter or unnecessary equipment. Stretcher in lowest position, wheels locked, appropriate side rails in place.

## 2021-02-20 NOTE — ED CDU PROVIDER DISPOSITION NOTE - PATIENT PORTAL LINK FT
You can access the FollowMyHealth Patient Portal offered by [facility] by registering at the following website: [email or web address]. By joining [employer]’s FollowMyHealth portal, you will also be able to view your health information using other applications (apps) compatible with our system.

## 2021-02-20 NOTE — CONSULT NOTE ADULT - ASSESSMENT
Patient is a 66 yo M Rt handed male with pmh of HLD presents to [facility] for dizziness and unsteady gait. Neuro consult for Left cerebellar stroke on CT head.   Patient states symptoms started yesterday evening and thought they would get better overnight. Patient woke this morning and stated his symptoms improved however, he felt unsteady still. Thus decided to go to the ED.   Patient takes ASA 81mg but was told by PCP to not take it for 2 weeks for colonoscopy. Patient has colonoscopy next friday. Patient denies headaches, numbness, weakness, and blurred vision.         Impression:   Left Cerebellar infarct 2/2 to small vessel disease vs atherosclerotic disease vs other etiology   Recommendations:   Brain MRI w/o contrast   tsh, lipid panel, hgb a1c   continue on tele   Echo w/ bubble study  for assessment of cardiac etiology- can be done inpatient or outpatient       The plan has been discussed with Stroke fellow.

## 2021-02-21 VITALS
SYSTOLIC BLOOD PRESSURE: 106 MMHG | TEMPERATURE: 98 F | RESPIRATION RATE: 18 BRPM | DIASTOLIC BLOOD PRESSURE: 68 MMHG | OXYGEN SATURATION: 97 % | HEART RATE: 72 BPM

## 2021-02-21 LAB
A1C WITH ESTIMATED AVERAGE GLUCOSE RESULT: 5.6 % — SIGNIFICANT CHANGE UP (ref 4–5.6)
CHOLEST SERPL-MCNC: 167 MG/DL — SIGNIFICANT CHANGE UP
ESTIMATED AVERAGE GLUCOSE: 114 MG/DL — SIGNIFICANT CHANGE UP (ref 68–114)
HDLC SERPL-MCNC: 46 MG/DL — SIGNIFICANT CHANGE UP
LIPID PNL WITH DIRECT LDL SERPL: 72 MG/DL — SIGNIFICANT CHANGE UP
NON HDL CHOLESTEROL: 120 MG/DL — SIGNIFICANT CHANGE UP
TRIGL SERPL-MCNC: 240 MG/DL — HIGH
TSH SERPL-MCNC: 0.48 UIU/ML — SIGNIFICANT CHANGE UP (ref 0.27–4.2)

## 2021-02-21 PROCEDURE — 85730 THROMBOPLASTIN TIME PARTIAL: CPT

## 2021-02-21 PROCEDURE — 93306 TTE W/DOPPLER COMPLETE: CPT

## 2021-02-21 PROCEDURE — 85025 COMPLETE CBC W/AUTO DIFF WBC: CPT

## 2021-02-21 PROCEDURE — 36000 PLACE NEEDLE IN VEIN: CPT

## 2021-02-21 PROCEDURE — 93005 ELECTROCARDIOGRAM TRACING: CPT

## 2021-02-21 PROCEDURE — 83735 ASSAY OF MAGNESIUM: CPT

## 2021-02-21 PROCEDURE — 76376 3D RENDER W/INTRP POSTPROCES: CPT | Mod: 26

## 2021-02-21 PROCEDURE — 70498 CT ANGIOGRAPHY NECK: CPT

## 2021-02-21 PROCEDURE — 85610 PROTHROMBIN TIME: CPT

## 2021-02-21 PROCEDURE — 99217: CPT

## 2021-02-21 PROCEDURE — 80053 COMPREHEN METABOLIC PANEL: CPT

## 2021-02-21 PROCEDURE — 84443 ASSAY THYROID STIM HORMONE: CPT

## 2021-02-21 PROCEDURE — 76376 3D RENDER W/INTRP POSTPROCES: CPT

## 2021-02-21 PROCEDURE — 70551 MRI BRAIN STEM W/O DYE: CPT

## 2021-02-21 PROCEDURE — 93306 TTE W/DOPPLER COMPLETE: CPT | Mod: 26

## 2021-02-21 PROCEDURE — 80061 LIPID PANEL: CPT

## 2021-02-21 PROCEDURE — G0378: CPT

## 2021-02-21 PROCEDURE — U0003: CPT

## 2021-02-21 PROCEDURE — 83036 HEMOGLOBIN GLYCOSYLATED A1C: CPT

## 2021-02-21 PROCEDURE — 70450 CT HEAD/BRAIN W/O DYE: CPT

## 2021-02-21 PROCEDURE — 70496 CT ANGIOGRAPHY HEAD: CPT

## 2021-02-21 PROCEDURE — 99284 EMERGENCY DEPT VISIT MOD MDM: CPT | Mod: 25

## 2021-02-21 PROCEDURE — U0005: CPT

## 2021-02-21 RX ORDER — CLOPIDOGREL BISULFATE 75 MG/1
1 TABLET, FILM COATED ORAL
Qty: 30 | Refills: 0
Start: 2021-02-21 | End: 2021-03-22

## 2021-02-21 RX ADMIN — SODIUM CHLORIDE 3 MILLILITER(S): 9 INJECTION INTRAMUSCULAR; INTRAVENOUS; SUBCUTANEOUS at 13:15

## 2021-02-21 RX ADMIN — SODIUM CHLORIDE 3 MILLILITER(S): 9 INJECTION INTRAMUSCULAR; INTRAVENOUS; SUBCUTANEOUS at 05:07

## 2021-08-26 ENCOUNTER — EMERGENCY (EMERGENCY)
Facility: HOSPITAL | Age: 66
LOS: 1 days | Discharge: HOME | End: 2021-08-26
Attending: EMERGENCY MEDICINE
Payer: COMMERCIAL

## 2021-08-26 VITALS
RESPIRATION RATE: 16 BRPM | TEMPERATURE: 98 F | OXYGEN SATURATION: 100 % | SYSTOLIC BLOOD PRESSURE: 116 MMHG | DIASTOLIC BLOOD PRESSURE: 75 MMHG | HEART RATE: 62 BPM

## 2021-08-26 VITALS
TEMPERATURE: 99 F | HEIGHT: 67 IN | HEART RATE: 61 BPM | SYSTOLIC BLOOD PRESSURE: 115 MMHG | RESPIRATION RATE: 16 BRPM | DIASTOLIC BLOOD PRESSURE: 70 MMHG

## 2021-08-26 LAB
ALBUMIN SERPL ELPH-MCNC: 4.2 G/DL — SIGNIFICANT CHANGE UP (ref 3.3–5)
ALP SERPL-CCNC: 74 U/L — SIGNIFICANT CHANGE UP (ref 40–120)
ALT FLD-CCNC: 15 U/L — SIGNIFICANT CHANGE UP (ref 10–45)
ANION GAP SERPL CALC-SCNC: 10 MMOL/L — SIGNIFICANT CHANGE UP (ref 5–17)
APPEARANCE UR: CLEAR — SIGNIFICANT CHANGE UP
APTT BLD: 30.4 SEC — SIGNIFICANT CHANGE UP (ref 27.5–35.5)
AST SERPL-CCNC: 17 U/L — SIGNIFICANT CHANGE UP (ref 10–40)
BASE EXCESS BLDV CALC-SCNC: 1.9 MMOL/L — SIGNIFICANT CHANGE UP (ref -2–2)
BASOPHILS # BLD AUTO: 0.06 K/UL — SIGNIFICANT CHANGE UP (ref 0–0.2)
BASOPHILS NFR BLD AUTO: 0.8 % — SIGNIFICANT CHANGE UP (ref 0–2)
BILIRUB SERPL-MCNC: 1.3 MG/DL — HIGH (ref 0.2–1.2)
BILIRUB UR-MCNC: NEGATIVE — SIGNIFICANT CHANGE UP
BUN SERPL-MCNC: 18 MG/DL — SIGNIFICANT CHANGE UP (ref 7–23)
CA-I SERPL-SCNC: 1.21 MMOL/L — SIGNIFICANT CHANGE UP (ref 1.15–1.33)
CALCIUM SERPL-MCNC: 9.1 MG/DL — SIGNIFICANT CHANGE UP (ref 8.4–10.5)
CHLORIDE BLDV-SCNC: 107 MMOL/L — SIGNIFICANT CHANGE UP (ref 96–108)
CHLORIDE SERPL-SCNC: 106 MMOL/L — SIGNIFICANT CHANGE UP (ref 96–108)
CO2 BLDV-SCNC: 30 MMOL/L — HIGH (ref 22–26)
CO2 SERPL-SCNC: 27 MMOL/L — SIGNIFICANT CHANGE UP (ref 22–31)
COLOR SPEC: YELLOW — SIGNIFICANT CHANGE UP
CREAT SERPL-MCNC: 1.07 MG/DL — SIGNIFICANT CHANGE UP (ref 0.5–1.3)
DIFF PNL FLD: NEGATIVE — SIGNIFICANT CHANGE UP
EOSINOPHIL # BLD AUTO: 0.19 K/UL — SIGNIFICANT CHANGE UP (ref 0–0.5)
EOSINOPHIL NFR BLD AUTO: 2.5 % — SIGNIFICANT CHANGE UP (ref 0–6)
GAS PNL BLDV: 140 MMOL/L — SIGNIFICANT CHANGE UP (ref 136–145)
GAS PNL BLDV: SIGNIFICANT CHANGE UP
GAS PNL BLDV: SIGNIFICANT CHANGE UP
GLUCOSE BLDV-MCNC: 88 MG/DL — SIGNIFICANT CHANGE UP (ref 70–99)
GLUCOSE SERPL-MCNC: 91 MG/DL — SIGNIFICANT CHANGE UP (ref 70–99)
GLUCOSE UR QL: NEGATIVE — SIGNIFICANT CHANGE UP
HCO3 BLDV-SCNC: 29 MMOL/L — SIGNIFICANT CHANGE UP (ref 22–29)
HCT VFR BLD CALC: 42.5 % — SIGNIFICANT CHANGE UP (ref 39–50)
HCT VFR BLDA CALC: 45 % — SIGNIFICANT CHANGE UP (ref 39–51)
HGB BLD CALC-MCNC: 14.9 G/DL — SIGNIFICANT CHANGE UP (ref 12.6–17.4)
HGB BLD-MCNC: 13.9 G/DL — SIGNIFICANT CHANGE UP (ref 13–17)
IMM GRANULOCYTES NFR BLD AUTO: 0.1 % — SIGNIFICANT CHANGE UP (ref 0–1.5)
INR BLD: 1.01 RATIO — SIGNIFICANT CHANGE UP (ref 0.88–1.16)
KETONES UR-MCNC: NEGATIVE — SIGNIFICANT CHANGE UP
LACTATE BLDV-MCNC: 0.4 MMOL/L — LOW (ref 0.7–2)
LEUKOCYTE ESTERASE UR-ACNC: NEGATIVE — SIGNIFICANT CHANGE UP
LYMPHOCYTES # BLD AUTO: 2.28 K/UL — SIGNIFICANT CHANGE UP (ref 1–3.3)
LYMPHOCYTES # BLD AUTO: 30.5 % — SIGNIFICANT CHANGE UP (ref 13–44)
MCHC RBC-ENTMCNC: 29.4 PG — SIGNIFICANT CHANGE UP (ref 27–34)
MCHC RBC-ENTMCNC: 32.7 GM/DL — SIGNIFICANT CHANGE UP (ref 32–36)
MCV RBC AUTO: 90 FL — SIGNIFICANT CHANGE UP (ref 80–100)
MONOCYTES # BLD AUTO: 0.46 K/UL — SIGNIFICANT CHANGE UP (ref 0–0.9)
MONOCYTES NFR BLD AUTO: 6.1 % — SIGNIFICANT CHANGE UP (ref 2–14)
NEUTROPHILS # BLD AUTO: 4.48 K/UL — SIGNIFICANT CHANGE UP (ref 1.8–7.4)
NEUTROPHILS NFR BLD AUTO: 60 % — SIGNIFICANT CHANGE UP (ref 43–77)
NITRITE UR-MCNC: NEGATIVE — SIGNIFICANT CHANGE UP
NRBC # BLD: 0 /100 WBCS — SIGNIFICANT CHANGE UP (ref 0–0)
PCO2 BLDV: 52 MMHG — SIGNIFICANT CHANGE UP (ref 42–55)
PH BLDV: 7.35 — SIGNIFICANT CHANGE UP (ref 7.32–7.43)
PH UR: 6.5 — SIGNIFICANT CHANGE UP (ref 5–8)
PLATELET # BLD AUTO: 121 K/UL — LOW (ref 150–400)
PO2 BLDV: 23 MMHG — LOW (ref 25–45)
POTASSIUM BLDV-SCNC: 4.5 MMOL/L — SIGNIFICANT CHANGE UP (ref 3.5–5.1)
POTASSIUM SERPL-MCNC: 4.5 MMOL/L — SIGNIFICANT CHANGE UP (ref 3.5–5.3)
POTASSIUM SERPL-SCNC: 4.5 MMOL/L — SIGNIFICANT CHANGE UP (ref 3.5–5.3)
PROT SERPL-MCNC: 6.7 G/DL — SIGNIFICANT CHANGE UP (ref 6–8.3)
PROT UR-MCNC: SIGNIFICANT CHANGE UP
PROTHROM AB SERPL-ACNC: 12.1 SEC — SIGNIFICANT CHANGE UP (ref 10.6–13.6)
RBC # BLD: 4.72 M/UL — SIGNIFICANT CHANGE UP (ref 4.2–5.8)
RBC # FLD: 13.9 % — SIGNIFICANT CHANGE UP (ref 10.3–14.5)
SAO2 % BLDV: 34.7 % — LOW (ref 67–88)
SARS-COV-2 RNA SPEC QL NAA+PROBE: SIGNIFICANT CHANGE UP
SODIUM SERPL-SCNC: 143 MMOL/L — SIGNIFICANT CHANGE UP (ref 135–145)
SP GR SPEC: 1.04 — HIGH (ref 1.01–1.02)
TROPONIN T, HIGH SENSITIVITY RESULT: 7 NG/L — SIGNIFICANT CHANGE UP (ref 0–51)
UROBILINOGEN FLD QL: NEGATIVE — SIGNIFICANT CHANGE UP
WBC # BLD: 7.48 K/UL — SIGNIFICANT CHANGE UP (ref 3.8–10.5)
WBC # FLD AUTO: 7.48 K/UL — SIGNIFICANT CHANGE UP (ref 3.8–10.5)

## 2021-08-26 PROCEDURE — 70496 CT ANGIOGRAPHY HEAD: CPT | Mod: MA

## 2021-08-26 PROCEDURE — 70498 CT ANGIOGRAPHY NECK: CPT | Mod: MA

## 2021-08-26 PROCEDURE — 85025 COMPLETE CBC W/AUTO DIFF WBC: CPT

## 2021-08-26 PROCEDURE — 85014 HEMATOCRIT: CPT

## 2021-08-26 PROCEDURE — 70496 CT ANGIOGRAPHY HEAD: CPT | Mod: 26,MA

## 2021-08-26 PROCEDURE — 84295 ASSAY OF SERUM SODIUM: CPT

## 2021-08-26 PROCEDURE — 82803 BLOOD GASES ANY COMBINATION: CPT

## 2021-08-26 PROCEDURE — 93010 ELECTROCARDIOGRAM REPORT: CPT

## 2021-08-26 PROCEDURE — U0005: CPT

## 2021-08-26 PROCEDURE — 85610 PROTHROMBIN TIME: CPT

## 2021-08-26 PROCEDURE — 80053 COMPREHEN METABOLIC PANEL: CPT

## 2021-08-26 PROCEDURE — 84132 ASSAY OF SERUM POTASSIUM: CPT

## 2021-08-26 PROCEDURE — 82962 GLUCOSE BLOOD TEST: CPT

## 2021-08-26 PROCEDURE — 99291 CRITICAL CARE FIRST HOUR: CPT | Mod: 25

## 2021-08-26 PROCEDURE — 82947 ASSAY GLUCOSE BLOOD QUANT: CPT

## 2021-08-26 PROCEDURE — 82435 ASSAY OF BLOOD CHLORIDE: CPT

## 2021-08-26 PROCEDURE — 83605 ASSAY OF LACTIC ACID: CPT

## 2021-08-26 PROCEDURE — 93005 ELECTROCARDIOGRAM TRACING: CPT

## 2021-08-26 PROCEDURE — 85018 HEMOGLOBIN: CPT

## 2021-08-26 PROCEDURE — 71045 X-RAY EXAM CHEST 1 VIEW: CPT | Mod: 26

## 2021-08-26 PROCEDURE — 85730 THROMBOPLASTIN TIME PARTIAL: CPT

## 2021-08-26 PROCEDURE — 70450 CT HEAD/BRAIN W/O DYE: CPT | Mod: MA

## 2021-08-26 PROCEDURE — 82330 ASSAY OF CALCIUM: CPT

## 2021-08-26 PROCEDURE — 70498 CT ANGIOGRAPHY NECK: CPT | Mod: 26,MA

## 2021-08-26 PROCEDURE — U0003: CPT

## 2021-08-26 PROCEDURE — 71045 X-RAY EXAM CHEST 1 VIEW: CPT

## 2021-08-26 PROCEDURE — 84484 ASSAY OF TROPONIN QUANT: CPT

## 2021-08-26 PROCEDURE — 81003 URINALYSIS AUTO W/O SCOPE: CPT

## 2021-08-26 NOTE — ED PROVIDER NOTE - OBJECTIVE STATEMENT
67y/o M with PMH of HLD, TIA, PFO presents to ED with transient L facial droop & dysarthria. Last normal ~2pm, happened suddenly and resolved within 30 min and completely asymptomatic by time of exam. 67y/o M with PMH of HLD, TIA & CVA, PFO on ASA & xarelto (blood clot in back of neck blood vessel?) presents to ED with transient L facial droop & dysarthria. Last normal ~2pm, happened suddenly and resolved within 30 min and completely asymptomatic by time of exam. Had similar episode and was in CDU Feb 2021 with a L cerebellar CVA. Sees Dr. Vogt for neuro. Denies any chest palpitations, CP, SOB, abd pain, N/v/d/c, active weakness, parethesias or other symptoms. Currently asymptomatic. No recent travel or sick contacts.

## 2021-08-26 NOTE — CONSULT NOTE ADULT - SUBJECTIVE AND OBJECTIVE BOX
HPI:  64 yo M R handed HLD presents to [facility] for transient L facial droop and dysarthria.    LKN: 8/26/21 at 14:00  NIHSS: 0  preMRS: 2  Pt is not a candidate for tpa due to symptoms resolved  Pt is not a candidate for mechanical thrombectomy due to no large vessel occlusion on CTA    REVIEW OF SYSTEMS    A 10-system ROS was performed and is negative except for those items noted above and/or in the HPI.    PAST MEDICAL & SURGICAL HISTORY:  HLD (hyperlipidemia)    No significant past surgical history      FAMILY HISTORY:  No pertinent family history in first degree relatives      SOCIAL HISTORY:   T/E/D:   Occupation:   Lives with:     MEDICATIONS (HOME):  Home Medications:    MEDICATIONS  (STANDING):    MEDICATIONS  (PRN):    ALLERGIES/INTOLERANCES:  Allergies  No Known Allergies    Intolerances    VITALS & EXAMINATION:  Vital Signs Last 24 Hrs  T(C): 37.1 (26 Aug 2021 15:16), Max: 37.1 (26 Aug 2021 15:16)  T(F): 98.7 (26 Aug 2021 15:16), Max: 98.7 (26 Aug 2021 15:16)  HR: 61 (26 Aug 2021 15:16) (61 - 61)  BP: 115/70 (26 Aug 2021 15:16) (115/70 - 115/70)  BP(mean): --  RR: 16 (26 Aug 2021 15:16) (16 - 16)  SpO2: --    General:  Constitutional: Male, appears stated age, in no apparent distress including pain  Head: Normocephalic & atraumatic.  Respiratory: No increased work of breathing  Extremities: No cyanosis, clubbing, or edema.  Skin: No rashes, bruising, or discoloration.    Neurological (>12):  MS: Awake, alert, oriented to person, place, situation, time. Normal affect. Follows all commands.    Language: Speech is clear, fluent with good repetition (tiptop, fifty-fifty, today is a bright and mabel day) & comprehension (able to name objects thumb    CNs: PERRL (R = 3mm, L = 3mm). VFF to counting fingers. EOMI no nystagmus, no diplopia. V1-3 intact to LT/pinprick, well developed masseter muscles b/l. No facial asymmetry b/l, full eye closure strength b/l. Hearing grossly normal (rubbing fingers) b/l. Symmetric palate elevation in midline. Gag reflex deferred. Head turning & shoulder shrug intact b/l. Tongue midline, normal movements, no atrophy.    Motor: Normal muscle bulk & tone. No noticeable tremor or seizure. No pronator drift.              Deltoid	Biceps	Triceps	Wrist	Finger ABd	   R	5	5	5	5	5		5 	  L	5	5	5	5	5		5    	H-Flex	H-Ext	H-ABd	H-ADd	K-Flex	K-Ext	D-Flex	P-Flex  R	5	5	5	5	5	5	5	5 	   L	5	5	5	5	5	5	5	5	     Sensation: Intact to LT b/l throughout.     Cortical: Extinction on DSS (neglect): none    Reflexes:              Biceps(C5)       BR(C6)     Triceps(C7)               Patellar(L4)    Achilles(S1)    Plantar Resp  R	2	          2	             2		        2		    2		Down   L	2	          2	             2		        2		    2		Down     Coordination: No dysmetria to FTN/HTS    Gait: No postural instability. Normal stance and tandem gait.     LABORATORY:      STUDIES & IMAGING:  Studies (EKG, EEG, EMG, etc):     Radiology (XR, CT, MR, U/S, TTE/AWAIS): HPI:  64 yo M R handed w/ prior CVA (2/2021, no residual deficits), HLD, former smoker presents to [facility] for transient L facial droop and dysarthria. LKN  8/26/21 at 14:00. At 14:15, pt reported 30 minutes of feeling like his L face was heavy/tingling and slurred speech. He is now back to baseline and denies any other symptoms. He reports a similar event happened about 1 month ago but less severe and he did not go to this hospital. He follows with Dr. Bautista Darnell (outpatient stroke neurology). He reports being on a blood thinner once a day (he thinks xarelto) for a narrow blood vessel in his neck.     LKN: 8/26/21 at 14:00  NIHSS: 0  preMRS: 2  Pt is not a candidate for tpa due to symptoms resolved  Pt is not a candidate for mechanical thrombectomy due to no large vessel occlusion on CTA    REVIEW OF SYSTEMS    A 10-system ROS was performed and is negative except for those items noted above and/or in the HPI.    PAST MEDICAL & SURGICAL HISTORY:  HLD (hyperlipidemia)    No significant past surgical history      FAMILY HISTORY:  No pertinent family history in first degree relatives      SOCIAL HISTORY:   T/E/D: former smoker  Occupation:   Lives with:     MEDICATIONS (HOME):  Home Medications:    MEDICATIONS  (STANDING):    MEDICATIONS  (PRN):    ALLERGIES/INTOLERANCES:  Allergies  No Known Allergies    Intolerances    VITALS & EXAMINATION:  Vital Signs Last 24 Hrs  T(C): 37.1 (26 Aug 2021 15:16), Max: 37.1 (26 Aug 2021 15:16)  T(F): 98.7 (26 Aug 2021 15:16), Max: 98.7 (26 Aug 2021 15:16)  HR: 61 (26 Aug 2021 15:16) (61 - 61)  BP: 115/70 (26 Aug 2021 15:16) (115/70 - 115/70)  BP(mean): --  RR: 16 (26 Aug 2021 15:16) (16 - 16)  SpO2: --    General:  Constitutional: Male, appears stated age, in no apparent distress including pain  Head: Normocephalic & atraumatic.  Respiratory: No increased work of breathing  Extremities: No cyanosis, clubbing, or edema.  Skin: No rashes, bruising, or discoloration.    Neurological (>12):  MS: Awake, alert, oriented to person, place, situation, time. Normal affect. Follows all commands.    Language: Speech is clear, fluent with good repetition (tiptop, fifty-fifty, today is a bright and mabel day) & comprehension (able to name objects thumb)    CNs: PERRL (R = 3mm, L = 3mm). VFF to counting fingers. EOMI no nystagmus, no diplopia. V1-3 intact to LT/pinprick, well developed masseter muscles b/l. No facial asymmetry b/l, full eye closure strength b/l. Hearing grossly normal (rubbing fingers) b/l. Symmetric palate elevation in midline. Gag reflex deferred. Head turning & shoulder shrug intact b/l. Tongue midline, normal movements, no atrophy.    Motor: Normal muscle bulk. No noticeable tremor or seizure. No pronator drift.              Deltoid	Biceps	Triceps	Wrist	Finger ABd	   R	5	5	5	5			5 	  L	5	5	5	5			5    	H-Flex	K-Flex	K-Ext	D-Flex	P-Flex  R	5	5	5	5	5		   L	5	5	5	5	5	    Sensation: Intact to LT b/l throughout.     Cortical: Extinction on DSS (neglect): none    Reflexes:              Biceps(C5)       BR(C6)     Triceps(C7)               Patellar(L4)    Achilles(S1)    Plantar Resp  R	1	          1             1		        2		    2		Down   L	1	          1	             1		        2		    2		Down     Coordination: No dysmetria to FTN/HTS    Gait: No postural instability. Normal stance and tandem gait.     LABORATORY:  LABS: Personally reviewed labs, imaging, and ECG                          13.9   7.48  )-----------( 121      ( 26 Aug 2021 15:34 )             42.5       08-26    143  |  106  |  18  ----------------------------<  91  4.5   |  27  |  1.07    Ca    9.1      26 Aug 2021 15:34    TPro  6.7  /  Alb  4.2  /  TBili  1.3<H>  /  DBili  x   /  AST  17  /  ALT  15  /  AlkPhos  74  08-26       LIVER FUNCTIONS - ( 26 Aug 2021 15:34 )  Alb: 4.2 g/dL / Pro: 6.7 g/dL / ALK PHOS: 74 U/L / ALT: 15 U/L / AST: 17 U/L / GGT: x                        PT/INR - ( 26 Aug 2021 15:34 )   PT: 12.1 sec;   INR: 1.01 ratio         PTT - ( 26 Aug 2021 15:34 )  PTT:30.4 sec    Lactate Trend    CAPILLARY BLOOD GLUCOSE  92 (26 Aug 2021 15:32)      POCT Blood Glucose.: 92 mg/dL (26 Aug 2021 15:15)    RADIOLOGY & ADDITIONAL TESTS:     STUDIES & IMAGING:  Studies (EKG, EEG, EMG, etc):     Radiology (XR, CT, MR, U/S, TTE/AWAIS):    < from: CT Angio Head w/ IV Cont (08.26.21 @ 15:51) >  IMPRESSION:    CT brain:  No hydrocephalus, acute intracranial hemorrhage, mass effect, or brain edema.  Chronic left parietal and bilateral cerebellar hemisphere infarcts.    CTA brain:  No vascular aneurysm or flow-limiting stenosis. No AVM.    CTA neck:  No flow-limiting stenosis or evidence for arterial dissection.      < end of copied text >    mr< from: MR Head No Cont (02.20.21 @ 22:30) >  IMPRESSION:    Acute left superior cerebellar infarct. No hemorrhagic transformation/conversion.    < end of copied text >    t< from: TTE with Doppler (w/3D Echo) (Transthoracic Echocardiogram) (02.21.21 @ 09:10) >  Conclusions:  1. Mitral annular calcification, otherwise normal mitral  valve. Minimal mitral regurgitation.  2. Calcified trileaflet aortic valve with normal opening.  No aortic valve regurgitation seen.  3. Normal left ventricular systolic function. No segmental  wall motion abnormalities.  4. Mild diastolic dysfunction (Stage I).  5. Normal right ventricular size and function.  6. Agitated saline injection demonstrates evidence of a  patent foramen ovale.    < end of copied text >  < from: TTE with Doppler (w/3D Echo) (Transthoracic Echocardiogram) (02.21.21 @ 09:10) >  EF (Visual Estimate): 70-75 %    < end of copied text >

## 2021-08-26 NOTE — ED PROVIDER NOTE - CLINICAL SUMMARY MEDICAL DECISION MAKING FREE TEXT BOX
[place]/PGY1: 65y/o M with HLD and PFO with recent CVA presents with facial droop & slurring that resolved <1hr suspicious for TIA. PT states he was told he had some 'blood clot' in the posterior vessels in head for which he was given ASA/xarelto. Given hx of PFO and transient symptoms - concerning for TIA. Unclear why xarelto, no hx of Afib or other arrhythmia on chart. CTH/CTA done, labs. Neuro recs pending. Possible CDU vs admit.

## 2021-08-26 NOTE — ED PROVIDER NOTE - NSFOLLOWUPINSTRUCTIONS_ED_ALL_ED_FT
Transient Ischemic Attack    You presented with slurred speech and facial droop suspicious for a 'small stroke' or a transient ischemic attack (TIA). You have a known history of stroke in the past with left cartoid stenosis on aspirin and xarelto. CT scans and physical exams showed no new lesions and resolution of symptoms. Neurology and Stroke team saw patient and assessed - at that time not a candidate for IV thrombolytics.     CT showed stable imaging without bleed. An MRI will be scheduled with Dr. Darnell through his office as appropriate. Continue all medications including aspirin and xarelto on discharge. Please continue with heart healthy diet and control of all chronic conditions. It is important that you follow up promptly on discharge for further evaluation. We have discussed this at length with you and your son at bedside.     - Patient can follow up with Dr. Bautista Darnell after discharge.   - Call [phone] to schedule an appointment within the next 2-3 days. Office is located at [address] [place] [address].    Rest, drink plenty of fluids.  Advance activity as tolerated.  Continue all previously prescribed medications as directed.  Follow up with your primary care physician in 48-72 hours- bring copies of your results.  Return to the ER for worsening or persistent symptoms, and/or ANY NEW OR CONCERNING SYMPTOMS. This includes but not limited to - increasing slurring, difficulty walking or moving limbs, numbness/tingling, facial droop, inability to walk straight, any visual or hearing changes or similar. If you have ANY OF THESE SEEK IMMEDIATE MEDICAL ATTENTION. Call EMS, [phone] for assistance if you do not have family or friends who can assist you. Do NOT drive if you have symptoms. If you have issues obtaining follow up, please call: [phone] to obtain a doctor or specialist who takes your insurance in your area.

## 2021-08-26 NOTE — ED PROVIDER NOTE - PHYSICAL EXAMINATION
CONSTITUTIONAL: NAD  SKIN: Warm dry  HEAD: NCAT  EYES: NL inspection  ENT: MMM  NECK: Supple; non tender.  CARD: RRR, no murmurs appreciated  RESP: CTAB  ABD: S/NT no R/G  EXT: no pedal edema  NEURO: Grossly unremarkable, NIH 0 at this time, no facial droop or slurring at this time, moving all limbs at request, no drift, EOMI/PERRLA   PSYCH: Cooperative, appropriate.

## 2021-08-26 NOTE — ED PROVIDER NOTE - CARE PROVIDER_API CALL
Bautista Darnell (DO)  Neurology; Vascular Neurology  [address] [facility] [address]  [place] [address]  Phone: [phone]  Fax: [phone]  Follow Up Time:

## 2021-08-26 NOTE — ED PROVIDER NOTE - ATTENDING CONTRIBUTION TO CARE
Attending Statement (ONESIMO Head MD):  CODE STROKE    HPI: 65y/o M presenting with weakness and slurred speech, beginning approximately 1 hours prior to arrival to the ED.  LKN 2PM; had with left facial droop and slurring of speech; resolved shortly after onset and patient relates no current symptoms.   Reports no weakness.    PMH: HLD, "blood clot in neck"  Meds: xarelto, asa, simvastatin  former smoker    Review of Systems:  -limited / critical care    All else negative unless otherwise specified elsewhere in this note.    PSH/PMH as noted above    On Physical Exam:  General: well appearing, in NAD, speaking clearly in full sentences and without difficulty; cooperative with exam  HEENT: PERRL, MMM  Neck: no neck tenderness, no nuchal rigidity  Cardiac: normal s1, s2; RRR; no MGR  Lungs: CTABL  Abdomen: soft nontender/nondistended  : no bladder tenderness or distension  Skin: intact, no rash  Extremities: no peripheral edema, no gross deformities  Neuro: no gross neurologic deficits: no facial asymmetry, moving all extremities equally, no gross areas of sensation loss, normal gait (able to walk from wheel chair to CT table).    MDM: concern for possible TIA; code stroke enacted as per hostpial protocol; CT/CTRA, labs, and consulting with neuro; not TPA candidate.  reassess after initial labs//imaging and will discuss with neuro for disposition. Attending Statement (ONESIMO Head MD):  CODE STROKE    HPI: 67y/o M presenting with weakness and slurred speech, beginning approximately 1 hours prior to arrival to the ED.  LKN 2PM; had with left facial droop and slurring of speech; resolved shortly after onset and patient relates no current symptoms.   Reports no weakness.    PMH: HLD, "blood clot in neck"  Meds: xarelto, asa, simvastatin  former smoker    Review of Systems:  -limited / critical care    All else negative unless otherwise specified elsewhere in this note.    PSH/PMH as noted above    On Physical Exam:  General: well appearing, in NAD, speaking clearly in full sentences and without difficulty; cooperative with exam  HEENT: PERRL, MMM  Neck: no neck tenderness, no nuchal rigidity  Cardiac: normal s1, s2; RRR; no MGR  Lungs: CTABL  Abdomen: soft nontender/nondistended  : no bladder tenderness or distension  Skin: intact, no rash  Extremities: no peripheral edema, no gross deformities  Neuro: no gross neurologic deficits: no facial asymmetry, moving all extremities equally, no gross areas of sensation loss, normal gait (able to walk from wheel chair to CT table).    MDM: concern for possible TIA; code stroke enacted as per hospital protocol; CT/CTA, labs, and consulting with neuro; not TPA candidate.  reassess after initial labs//imaging and will discuss with neuro for disposition.    Please see above progress notes above for updates to medical decision making and the patient's clinical course.

## 2021-08-26 NOTE — ED ADULT NURSE REASSESSMENT NOTE - NS ED NURSE REASSESS COMMENT FT1
As per Lauryn HENDRIX, awaiting neuro recommendations. Pt aware of plan of care. VSS. Q 1hr neuro checks still being performed.

## 2021-08-26 NOTE — ED ADULT NURSE REASSESSMENT NOTE - NS ED NURSE REASSESS COMMENT FT1
Pt discharged by Lauryn HENDRIX to follow up with Neuro. IV removed and VSS by Lauryn HENDRIX. Pt ambulate to discharge area with steady gait.

## 2021-08-26 NOTE — STROKE CODE NOTE - DISPOSITION
TBD, case discussed with stroke fellow Dr. Bindu Sterling under the supervision of Dr. Alfonso Garcia/Other

## 2021-08-26 NOTE — ED ADULT TRIAGE NOTE - BEFAST BALANCE
Patient wants to know if you have ever checked her for EPI if not she wants to be checked states she thinks she might have this states she's been reading about it and is sounds like all her symptoms that she has with abdominal pain and diarrhea.  [phone]   No

## 2021-08-26 NOTE — ED PROVIDER NOTE - PROGRESS NOTE DETAILS
Attending note (Sonido): patient remains symptomatic; ct results reviewed, Chronic left parietal and bilateral cerebellar hemisphere infarcts however no acute pathology identified.  Labs reviewed, no acute actionable findings.  Will discuss with neurology for their recommendations before disposition. Attending note (Sonido): spoke with neurology / stroke service: patient with recent work up and evaluation, no further emergent testing warranted and is currently on appropriate medical management; is stable for dc and to have close interval follow-up with his neurologist.

## 2021-08-26 NOTE — ED PROVIDER NOTE - NS ED ROS FT
Constitutional:  See HPI  Eyes:  No visual changes  ENMT: No neck pain or stiffness  Cardiac:  No chest pain  Respiratory:  No cough or respiratory distress.   GI:  No nausea, vomiting, diarrhea or abdominal pain.  MS:  No back pain.  Neuro:  No headache , no weakness, slurring, parethesia, inability to move body, no visual or hearing changes, no difficulty swallowing   Skin:  No skin rash  Except as documented in the HPI,  all other systems are negative

## 2021-08-26 NOTE — ED PROVIDER NOTE - PATIENT PORTAL LINK FT
You can access the FollowMyHealth Patient Portal offered by [facility] by registering at the following website: [email or web address]. By joining [employer]’s FollowMyHealth portal, you will also be able to view your health information using other applications (apps) compatible with our system.

## 2021-08-26 NOTE — CONSULT NOTE ADULT - ASSESSMENT
66 yo M R handed w/ prior CVA (2/2021, no residual deficits), PFO, HLD, former smoker presents to [facility] for transient L facial droop and dysarthria. LKN  8/26/21 at 14:00. At 14:15, pt reported 30 minutes of feeling like his L face was heavy/tingling and slurred speech. He is now back to baseline and denies any other symptoms. He reports a similar event happened about 1 month ago but less severe and he did not go to this hospital. He follows with Dr. Bautista Darnell (outpatient stroke neurology). He reports being on a blood thinner once a day (he thinks xarelto) for a narrow blood vessel in his neck. Neuro exam nonfocal, no dysarthria, 5/5 strength, no dysmetria. CTH w/ chronic L parietal and b/l cerebellar infarcts. CTA w/ known severe L vertebral stenosis.    Impression: Transient L facial droop and dysarthria, now resolved, likely 2/2 TIA vs. minor stroke. Known L vertebral stenosis    Plan:  [] MRI brain without contrast, can be done inpatient vs. outpatient  [x] TTE 2/2021 w/ PFO and LVEF 70-75%  [] ILR to assess for afib, can be done inpatient vs. outpatient  [] c/w home ASA 81 mg PO daily  [] c/w home Xarelto for L vertebral stenosis  [] c/w home Atorvastatin 40mg (titrate to LDL < 70)   [] check HA1c, lipid panel  [] bedside S&S  [] Patient can follow up with Dr. Bautista Darnell (known pt) after discharge. Please instruct the patient to call [phone] to schedule an appointment within the next 2-3 days. Office is located at [address] [place] [address].    Case discussed with stroke fellow Dr. Bindu Sterling under the supervision of Dr. Alfonso Garcia. Case discussed with attending Dr. Bautista Darnell     66 yo M R handed w/ prior CVA (2/2021, no residual deficits), PFO, HLD, former smoker presents to [facility] for transient L facial droop and dysarthria. LKN  8/26/21 at 14:00. At 14:15, pt reported 30 minutes of feeling like his L face was heavy/tingling and slurred speech. He is now back to baseline and denies any other symptoms. He reports a similar event happened about 1 month ago but less severe and he did not go to this hospital. He follows with Dr. Bautista Darnell (outpatient stroke neurology). He reports being on a blood thinner once a day (he thinks xarelto) for a narrow blood vessel in his neck. Neuro exam nonfocal, no dysarthria, 5/5 strength, no dysmetria. CTH w/ chronic L parietal and b/l cerebellar infarcts. CTA w/ known severe L vertebral stenosis.    Impression: Transient L facial droop and dysarthria, now resolved, likely 2/2 TIA vs. minor stroke. Known L vertebral stenosis    Plan:  [] MRI brain without contrast, can be done inpatient vs. outpatient  [x] TTE 2/2021 w/ PFO and LVEF 70-75%  [] ILR to assess for afib, can be done inpatient vs. outpatient  [] c/w home ASA 81 mg PO daily  [] c/w home Xarelto for L vertebral stenosis  [] c/w home Atorvastatin 40mg (titrate to LDL < 70)   [] check HA1c, lipid panel  [] bedside S&S  [] encourage PO intake to avoid hypotension  [] Patient can follow up with Dr. Bautista Darnell (known pt) after discharge. Please instruct the patient to call [phone] to schedule an appointment within the next 2-3 days. Office is located at [address] [place] [address], [place] [address].    Case discussed with stroke fellow Dr. Bindu Sterling under the supervision of Dr. Alfonso Garcia. Case discussed with attending Dr. Bautista Darnell     64 yo M R handed w/ prior CVA (2/2021, no residual deficits), PFO, HLD, former smoker presents to [facility] for transient L facial droop and dysarthria. LKN  8/26/21 at 14:00. At 14:15, pt reported 30 minutes of feeling like his L face was heavy/tingling and slurred speech. He is now back to baseline and denies any other symptoms. He reports a similar event happened about 1 month ago but less severe and he did not go to this hospital. He follows with Dr. Bautista Darnell (outpatient stroke neurology). He reports being on a blood thinner once a day (he thinks xarelto) for a narrow blood vessel in his neck. Neuro exam nonfocal, no dysarthria, 5/5 strength, no dysmetria. CTH w/ chronic L parietal and b/l cerebellar infarcts. CTA w/ known severe L vertebral stenosis.    Impression: Transient L facial droop and dysarthria, now resolved, likely 2/2 TIA vs. minor stroke. Known L vertebral stenosis    Plan:  [] MRI brain without contrast, can be done inpatient vs. outpatient  [x] TTE 2/2021 w/ PFO and LVEF 70-75%  [] ILR to assess for afib, can be done inpatient vs. outpatient  [] c/w home ASA 81 mg PO daily  [] c/w home Xarelto for L vertebral stenosis  [] c/w home Atorvastatin 40mg (titrate to LDL < 70)   [] check HA1c, lipid panel  [] bedside S&S  [] encourage PO intake to avoid hypotension  [] neurologically stable for discharge  [] Patient can follow up with Dr. Bautista Darnell (known pt) after discharge. Please instruct the patient to call [phone] to schedule an appointment within the next 2-3 days. Office is located at [address] [place] [address], [place] [address].    Case discussed with stroke fellow Dr. Bindu Sterling under the supervision of Dr. Alfonso Garcia. Case discussed with attending Dr. Bautista Darnell

## 2021-09-16 ENCOUNTER — APPOINTMENT (OUTPATIENT)
Dept: MRI IMAGING | Facility: CLINIC | Age: 66
End: 2021-09-16
Payer: COMMERCIAL

## 2021-09-16 ENCOUNTER — OUTPATIENT (OUTPATIENT)
Dept: OUTPATIENT SERVICES | Facility: HOSPITAL | Age: 66
LOS: 1 days | End: 2021-09-16
Payer: COMMERCIAL

## 2021-09-16 DIAGNOSIS — G43.701 CHRONIC MIGRAINE WITHOUT AURA, NOT INTRACTABLE, WITH STATUS MIGRAINOSUS: ICD-10-CM

## 2021-09-16 DIAGNOSIS — Z00.8 ENCOUNTER FOR OTHER GENERAL EXAMINATION: ICD-10-CM

## 2021-09-16 PROCEDURE — 70551 MRI BRAIN STEM W/O DYE: CPT

## 2021-09-16 PROCEDURE — 70551 MRI BRAIN STEM W/O DYE: CPT | Mod: 26

## 2021-10-12 ENCOUNTER — APPOINTMENT (OUTPATIENT)
Dept: NEUROLOGY | Facility: CLINIC | Age: 66
End: 2021-10-12
Payer: COMMERCIAL

## 2021-10-12 VITALS
WEIGHT: 155 LBS | DIASTOLIC BLOOD PRESSURE: 71 MMHG | SYSTOLIC BLOOD PRESSURE: 126 MMHG | HEART RATE: 56 BPM | HEIGHT: 67 IN | BODY MASS INDEX: 24.33 KG/M2

## 2021-10-12 DIAGNOSIS — Z82.3 FAMILY HISTORY OF STROKE: ICD-10-CM

## 2021-10-12 DIAGNOSIS — I65.09 OCCLUSION AND STENOSIS OF UNSPECIFIED VERTEBRAL ARTERY: ICD-10-CM

## 2021-10-12 DIAGNOSIS — I63.9 CEREBRAL INFARCTION, UNSPECIFIED: ICD-10-CM

## 2021-10-12 DIAGNOSIS — G43.109 MIGRAINE WITH AURA, NOT INTRACTABLE, W/OUT STATUS MIGRAINOSUS: ICD-10-CM

## 2021-10-12 PROCEDURE — 99205 OFFICE O/P NEW HI 60 MIN: CPT

## 2021-10-12 NOTE — HISTORY OF PRESENT ILLNESS
[FreeTextEntry1] : Mr. Black is a 66 year old man with a PMHx of HLD, left cerebellar infarct, found to have left vertebral stenosis and PFO in 02/21. He  was evaluated at [facility] on 08/26/21 for transient left face numbness and slurred speech. MRI Head showed chronic left cerebellar infarct. He reports he has had episodes of vision loss, slurred speech and left sided numbness since 13. He describes the episode in February 2021 as dizziness, left hemisensory loss, and an unsteady gait. He was on ASA and Brilinta 90 mg BID started by his cardiologist, however, Dr. Darnell stopped the Brilinta and he is now only on ASA and Lipitor for stroke prevention. All neuroimaging reviewed personally by me. He denies any interval TIA/Stroke symptoms.

## 2021-10-12 NOTE — DISCUSSION/SUMMARY
[FreeTextEntry1] : Mr. Black is a 66 year old man with a PMHx of HLD now 8 months s/p left cerebellar infarct in AICA territory, etiology ESUS. 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 We discussed his episodes of visual filed loss, slurred speech and left sided numbness are likely a complicated migraine and not TIAs. Plan to have an ILR placed to rule out A. Fib as possible source for the AICA stroke as I discussed with his cardiologist, Dr. Babatunde Simpson [phone]. I do not believe the PFO is the source of his stroke and I am not recommending closure, but I do leave that decision to his cardiologist. He will continue the ASA and Lipitor for stroke prevention. All of their questions and concerns were addressed.  normal (ped)...

## 2021-10-12 NOTE — PHYSICAL EXAM

## 2021-10-12 NOTE — CONSULT LETTER
[Dear  ___] : Dear  [unfilled], [Consult Letter:] : I had the pleasure of evaluating your patient, [unfilled]. [( Thank you for referring [unfilled] for consultation for _____ )] : Thank you for referring [unfilled] for consultation for [unfilled] [Consult Closing:] : Thank you very much for allowing me to participate in the care of this patient.  If you have any questions, please do not hesitate to contact me. [Please see my note below.] : Please see my note below. [Sincerely,] : Sincerely, [DrJohnson  ___] : Dr. ORTIZ [FreeTextEntry3] : Rodri Henry MD\par Chief, Vascular Neurology and Neurology Service , NeuroEndovascular Surgery\par  of Neurology and Radiology\par [facility] [employer] [facility]\par Director, Comprehensive Stroke Center and Stroke Unit\par [facility]\par Director, NeuroEndovascular Surgery\par [facility]

## 2021-10-12 NOTE — REVIEW OF SYSTEMS
[Fever] : no fever [Chills] : no chills [Feeling Poorly] : not feeling poorly [Feeling Tired] : not feeling tired [Suicidal] : not suicidal [Anxiety] : no anxiety [Depression] : no depression [Confused or Disoriented] : no confusion [Memory Lapses or Loss] : no memory loss [Decr. Concentrating Ability] : no decrease in concentrating ability [Difficulty with Language] : no ~M difficulty with language [Changed Thought Patterns] : no change in thought patterns [Repeating Questions] : no repeated questioning about recent events [Facial Weakness] : no facial weakness [Arm Weakness] : no arm weakness [Hand Weakness] : no hand weakness [Leg Weakness] : no leg weakness [Poor Coordination] : good coordination [Difficulty Writing] : no difficulty writing [Difficulties in Speech] : no speech difficulties [Numbness] : no numbness [Tingling] : no tingling [Eyesight Problems] : no eyesight problems [Loss Of Hearing] : no hearing loss [Chest Pain] : no chest pain [Shortness Of Breath] : no shortness of breath [Wheezing] : no wheezing [Vomiting] : no vomiting [Incontinence] : no incontinence [Joint Pain] : no joint pain [Easy Bruising] : no tendency for easy bruising

## 2021-12-20 ENCOUNTER — INPATIENT (INPATIENT)
Facility: HOSPITAL | Age: 66
LOS: 1 days | Discharge: HOME | DRG: 244 | End: 2021-12-22
Attending: INTERNAL MEDICINE | Admitting: STUDENT IN AN ORGANIZED HEALTH CARE EDUCATION/TRAINING PROGRAM
Payer: COMMERCIAL

## 2021-12-20 VITALS
WEIGHT: 166.89 LBS | TEMPERATURE: 98 F | DIASTOLIC BLOOD PRESSURE: 81 MMHG | RESPIRATION RATE: 15 BRPM | HEIGHT: 67 IN | OXYGEN SATURATION: 100 % | HEART RATE: 42 BPM | SYSTOLIC BLOOD PRESSURE: 184 MMHG

## 2021-12-20 DIAGNOSIS — I44.2 ATRIOVENTRICULAR BLOCK, COMPLETE: ICD-10-CM

## 2021-12-20 LAB
ALBUMIN SERPL ELPH-MCNC: 3.9 G/DL — SIGNIFICANT CHANGE UP (ref 3.3–5)
ALBUMIN SERPL ELPH-MCNC: 4.3 G/DL — SIGNIFICANT CHANGE UP (ref 3.3–5)
ALP SERPL-CCNC: 132 U/L — HIGH (ref 40–120)
ALP SERPL-CCNC: 157 U/L — HIGH (ref 40–120)
ALT FLD-CCNC: 65 U/L — HIGH (ref 10–45)
ALT FLD-CCNC: 75 U/L — HIGH (ref 10–45)
ANION GAP SERPL CALC-SCNC: 11 MMOL/L — SIGNIFICANT CHANGE UP (ref 5–17)
ANION GAP SERPL CALC-SCNC: 13 MMOL/L — SIGNIFICANT CHANGE UP (ref 5–17)
APTT BLD: 28 SEC — SIGNIFICANT CHANGE UP (ref 27.5–35.5)
AST SERPL-CCNC: 108 U/L — HIGH (ref 10–40)
AST SERPL-CCNC: 58 U/L — HIGH (ref 10–40)
BASOPHILS # BLD AUTO: 0.05 K/UL — SIGNIFICANT CHANGE UP (ref 0–0.2)
BASOPHILS # BLD AUTO: 0.07 K/UL — SIGNIFICANT CHANGE UP (ref 0–0.2)
BASOPHILS NFR BLD AUTO: 0.7 % — SIGNIFICANT CHANGE UP (ref 0–2)
BASOPHILS NFR BLD AUTO: 0.9 % — SIGNIFICANT CHANGE UP (ref 0–2)
BILIRUB SERPL-MCNC: 0.9 MG/DL — SIGNIFICANT CHANGE UP (ref 0.2–1.2)
BILIRUB SERPL-MCNC: 1.8 MG/DL — HIGH (ref 0.2–1.2)
BLD GP AB SCN SERPL QL: NEGATIVE — SIGNIFICANT CHANGE UP
BUN SERPL-MCNC: 21 MG/DL — SIGNIFICANT CHANGE UP (ref 7–23)
BUN SERPL-MCNC: 27 MG/DL — HIGH (ref 7–23)
CALCIUM SERPL-MCNC: 8.8 MG/DL — SIGNIFICANT CHANGE UP (ref 8.4–10.5)
CALCIUM SERPL-MCNC: 9.1 MG/DL — SIGNIFICANT CHANGE UP (ref 8.4–10.5)
CHLORIDE SERPL-SCNC: 106 MMOL/L — SIGNIFICANT CHANGE UP (ref 96–108)
CHLORIDE SERPL-SCNC: 108 MMOL/L — SIGNIFICANT CHANGE UP (ref 96–108)
CO2 SERPL-SCNC: 21 MMOL/L — LOW (ref 22–31)
CO2 SERPL-SCNC: 25 MMOL/L — SIGNIFICANT CHANGE UP (ref 22–31)
CREAT SERPL-MCNC: 1.11 MG/DL — SIGNIFICANT CHANGE UP (ref 0.5–1.3)
CREAT SERPL-MCNC: 1.24 MG/DL — SIGNIFICANT CHANGE UP (ref 0.5–1.3)
EOSINOPHIL # BLD AUTO: 0.09 K/UL — SIGNIFICANT CHANGE UP (ref 0–0.5)
EOSINOPHIL # BLD AUTO: 0.11 K/UL — SIGNIFICANT CHANGE UP (ref 0–0.5)
EOSINOPHIL NFR BLD AUTO: 1.2 % — SIGNIFICANT CHANGE UP (ref 0–6)
EOSINOPHIL NFR BLD AUTO: 1.6 % — SIGNIFICANT CHANGE UP (ref 0–6)
GLUCOSE SERPL-MCNC: 106 MG/DL — HIGH (ref 70–99)
GLUCOSE SERPL-MCNC: 72 MG/DL — SIGNIFICANT CHANGE UP (ref 70–99)
HCT VFR BLD CALC: 40.1 % — SIGNIFICANT CHANGE UP (ref 39–50)
HCT VFR BLD CALC: 43.9 % — SIGNIFICANT CHANGE UP (ref 39–50)
HGB BLD-MCNC: 13.2 G/DL — SIGNIFICANT CHANGE UP (ref 13–17)
HGB BLD-MCNC: 14 G/DL — SIGNIFICANT CHANGE UP (ref 13–17)
IMM GRANULOCYTES NFR BLD AUTO: 0.3 % — SIGNIFICANT CHANGE UP (ref 0–1.5)
IMM GRANULOCYTES NFR BLD AUTO: 0.5 % — SIGNIFICANT CHANGE UP (ref 0–1.5)
INR BLD: 0.96 RATIO — SIGNIFICANT CHANGE UP (ref 0.88–1.16)
LYMPHOCYTES # BLD AUTO: 1.57 K/UL — SIGNIFICANT CHANGE UP (ref 1–3.3)
LYMPHOCYTES # BLD AUTO: 1.95 K/UL — SIGNIFICANT CHANGE UP (ref 1–3.3)
LYMPHOCYTES # BLD AUTO: 20.9 % — SIGNIFICANT CHANGE UP (ref 13–44)
LYMPHOCYTES # BLD AUTO: 28.1 % — SIGNIFICANT CHANGE UP (ref 13–44)
MAGNESIUM SERPL-MCNC: 2.1 MG/DL — SIGNIFICANT CHANGE UP (ref 1.6–2.6)
MAGNESIUM SERPL-MCNC: 2.1 MG/DL — SIGNIFICANT CHANGE UP (ref 1.6–2.6)
MCHC RBC-ENTMCNC: 27.8 PG — SIGNIFICANT CHANGE UP (ref 27–34)
MCHC RBC-ENTMCNC: 28 PG — SIGNIFICANT CHANGE UP (ref 27–34)
MCHC RBC-ENTMCNC: 31.9 GM/DL — LOW (ref 32–36)
MCHC RBC-ENTMCNC: 32.9 GM/DL — SIGNIFICANT CHANGE UP (ref 32–36)
MCV RBC AUTO: 85.1 FL — SIGNIFICANT CHANGE UP (ref 80–100)
MCV RBC AUTO: 87.3 FL — SIGNIFICANT CHANGE UP (ref 80–100)
MONOCYTES # BLD AUTO: 0.4 K/UL — SIGNIFICANT CHANGE UP (ref 0–0.9)
MONOCYTES # BLD AUTO: 0.46 K/UL — SIGNIFICANT CHANGE UP (ref 0–0.9)
MONOCYTES NFR BLD AUTO: 5.3 % — SIGNIFICANT CHANGE UP (ref 2–14)
MONOCYTES NFR BLD AUTO: 6.6 % — SIGNIFICANT CHANGE UP (ref 2–14)
NEUTROPHILS # BLD AUTO: 4.35 K/UL — SIGNIFICANT CHANGE UP (ref 1.8–7.4)
NEUTROPHILS # BLD AUTO: 5.34 K/UL — SIGNIFICANT CHANGE UP (ref 1.8–7.4)
NEUTROPHILS NFR BLD AUTO: 62.7 % — SIGNIFICANT CHANGE UP (ref 43–77)
NEUTROPHILS NFR BLD AUTO: 71.2 % — SIGNIFICANT CHANGE UP (ref 43–77)
NRBC # BLD: 0 /100 WBCS — SIGNIFICANT CHANGE UP (ref 0–0)
NRBC # BLD: 0 /100 WBCS — SIGNIFICANT CHANGE UP (ref 0–0)
NT-PROBNP SERPL-SCNC: 361 PG/ML — HIGH (ref 0–300)
PHOSPHATE SERPL-MCNC: 2.6 MG/DL — SIGNIFICANT CHANGE UP (ref 2.5–4.5)
PLATELET # BLD AUTO: 127 K/UL — LOW (ref 150–400)
PLATELET # BLD AUTO: 130 K/UL — LOW (ref 150–400)
POTASSIUM SERPL-MCNC: 4.3 MMOL/L — SIGNIFICANT CHANGE UP (ref 3.5–5.3)
POTASSIUM SERPL-MCNC: 4.4 MMOL/L — SIGNIFICANT CHANGE UP (ref 3.5–5.3)
POTASSIUM SERPL-SCNC: 4.3 MMOL/L — SIGNIFICANT CHANGE UP (ref 3.5–5.3)
POTASSIUM SERPL-SCNC: 4.4 MMOL/L — SIGNIFICANT CHANGE UP (ref 3.5–5.3)
PROT SERPL-MCNC: 6.1 G/DL — SIGNIFICANT CHANGE UP (ref 6–8.3)
PROT SERPL-MCNC: 6.8 G/DL — SIGNIFICANT CHANGE UP (ref 6–8.3)
PROTHROM AB SERPL-ACNC: 11.5 SEC — SIGNIFICANT CHANGE UP (ref 10.6–13.6)
RBC # BLD: 4.71 M/UL — SIGNIFICANT CHANGE UP (ref 4.2–5.8)
RBC # BLD: 5.03 M/UL — SIGNIFICANT CHANGE UP (ref 4.2–5.8)
RBC # FLD: 14.3 % — SIGNIFICANT CHANGE UP (ref 10.3–14.5)
RBC # FLD: 14.6 % — HIGH (ref 10.3–14.5)
RH IG SCN BLD-IMP: POSITIVE — SIGNIFICANT CHANGE UP
SARS-COV-2 RNA SPEC QL NAA+PROBE: SIGNIFICANT CHANGE UP
SODIUM SERPL-SCNC: 142 MMOL/L — SIGNIFICANT CHANGE UP (ref 135–145)
SODIUM SERPL-SCNC: 142 MMOL/L — SIGNIFICANT CHANGE UP (ref 135–145)
TROPONIN T, HIGH SENSITIVITY RESULT: 6 NG/L — SIGNIFICANT CHANGE UP (ref 0–51)
TROPONIN T, HIGH SENSITIVITY RESULT: <6 NG/L — SIGNIFICANT CHANGE UP (ref 0–51)
WBC # BLD: 6.94 K/UL — SIGNIFICANT CHANGE UP (ref 3.8–10.5)
WBC # BLD: 7.51 K/UL — SIGNIFICANT CHANGE UP (ref 3.8–10.5)
WBC # FLD AUTO: 6.94 K/UL — SIGNIFICANT CHANGE UP (ref 3.8–10.5)
WBC # FLD AUTO: 7.51 K/UL — SIGNIFICANT CHANGE UP (ref 3.8–10.5)

## 2021-12-20 PROCEDURE — 93010 ELECTROCARDIOGRAM REPORT: CPT | Mod: 76

## 2021-12-20 PROCEDURE — 99291 CRITICAL CARE FIRST HOUR: CPT | Mod: 25

## 2021-12-20 PROCEDURE — 99223 1ST HOSP IP/OBS HIGH 75: CPT | Mod: 25,57

## 2021-12-20 PROCEDURE — 93010 ELECTROCARDIOGRAM REPORT: CPT

## 2021-12-20 PROCEDURE — 71045 X-RAY EXAM CHEST 1 VIEW: CPT | Mod: 26

## 2021-12-20 PROCEDURE — 93291 INTERROG DEV EVAL SCRMS IP: CPT | Mod: 26

## 2021-12-20 RX ORDER — CHLORHEXIDINE GLUCONATE 213 G/1000ML
1 SOLUTION TOPICAL
Refills: 0 | Status: DISCONTINUED | OUTPATIENT
Start: 2021-12-20 | End: 2021-12-21

## 2021-12-20 NOTE — ED PROVIDER NOTE - PROGRESS NOTE DETAILS
Brandi PGY3: EP consulted Call to pts cardiologist, Dr. Babatunde Simpson [phone]. unable to reach, would like to discuss pts loop recorder, ? recent PFO sx at [facility] call to pts son and pts daughter regarding admission, unable to reach w/ numbers in chart Brandi PGY3: CCU consulted for symptomatic bradycardia call to pts son and pts wife regarding admission, unable to reach w/ numbers in chart [id number]: pt received at sign-out, seen and evaluated at bedside.  Pt sitting comfortably in NAD. Discussed with cards, accepted for CCU admission.

## 2021-12-20 NOTE — ED PROVIDER NOTE - HIV OFFER
It's unlikely to at 4000 units a day, but let's be objective and get a vitamin D level to see how much supplementation she really needs.  Dx:  Elevated alkaline phosphatase.     Previously Declined (within the last year)

## 2021-12-20 NOTE — ED ADULT NURSE NOTE - OBJECTIVE STATEMENT
66 M A&Ox4 pmhx of HTN, recent cardiac sx, presenting to ED after an episode of near syncope that occurred 30 minutes prior to arrival, pt states he felt lightheaded dizzy and nauseous. Pt states he didn't hit his head and did not actually fall. pt states that he has mild cp on and off for a couple of days but currently denies any chest pain. denies sob, fevers, chills, LE Edema, back pain, abd pain, VD, urinary symptoms.

## 2021-12-20 NOTE — PATIENT PROFILE ADULT - FALL HARM RISK - HARM RISK INTERVENTIONS

## 2021-12-20 NOTE — ED PROVIDER NOTE - ATTENDING CONTRIBUTION TO CARE
66 M w/ hx of HTN, recent PFO sx in [place], presents to the ER w/ an episode of syncope that occurred 30 minutes prior to arrival, pt states he felt lightheaded dizzy and nausous. Pt states he didn't hit his head. No fevers, no chills. he lowered himself to the ground, and almost lost consciousness, pt states that he has mild cp, no sob. no leg swelling. On exam, pt is awake and alert in no distress has clear lungs soft abdomen, no lower extremity edema. Pt is nontoxic appearing. Plan for labs imaging and admission, will consult cardiology regarding concern for heart block. 66 M w/ hx of HTN, recent PFO sx in [place], presents to the ER w/ an episode of syncope that occurred 30 minutes prior to arrival, pt states he felt lightheaded dizzy and nauseous. Pt states he didn't hit his head. No fevers, no chills. he lowered himself to the ground, and almost lost consciousness, pt states that he has mild cp, no sob. no leg swelling. On exam, pt is awake and alert in no distress has clear lungs soft abdomen, no lower extremity edema. Pt is nontoxic appearing. Plan for labs imaging and admission, will consult cardiology regarding concern for heart block vs mobitz 2 but pt will likely need pacemaker

## 2021-12-20 NOTE — PROGRESS NOTE ADULT - SUBJECTIVE AND OBJECTIVE BOX
HUMBERTO SMITH  MRN-[id number]  Patient is a 66y old  Male who presents with a chief complaint of AV block (20 Dec 2021 17:58)    HPI:  66M history of TIA, PFO (dx Feb 2021), vertebral stenosis on Xarelto who presents with complaints of losing consciousness since sunday. pt states he has had 3 episodes of LOC, lasting ~1minute. Never had similar symptoms before. Also notes a few days of intermittent exertional chest pain/generalized weakness. No fever, chills, palpitations, SOB.  ED course: Vitals HR 41, /81, satting 99% on RA, RR 16.  patient found to be in 2:1 AVB with episodes of complete heart block. Asymptomatic at rest. Exam and labs unremarkable. EP consulted, patient transferred to CDU for hemodynamic monitoring   Initial trop 6 -> rpt 6. pro bnp 361.  (20 Dec 2021 17:45)      Hospital Course:    24 HOUR EVENTS:    REVIEW OF SYSTEMS:    CONSTITUTIONAL: No weakness, fevers or chills  EYES/ENT: No visual changes;  No vertigo or throat pain   NECK: No pain or stiffness  RESPIRATORY: No cough, wheezing, hemoptysis; No shortness of breath  CARDIOVASCULAR: No chest pain or palpitations  GASTROINTESTINAL: No abdominal or epigastric pain. No nausea, vomiting, or hematemesis; No diarrhea or constipation. No melena or hematochezia.  GENITOURINARY: No dysuria, frequency or hematuria  NEUROLOGICAL: No numbness or weakness  SKIN: No itching, rashes      ICU Vital Signs Last 24 Hrs  T(C): 37.1 (20 Dec 2021 20:00), Max: 37.1 (20 Dec 2021 18:12)  T(F): 98.8 (20 Dec 2021 20:00), Max: 98.8 (20 Dec 2021 18:12)  HR: 36 (20 Dec 2021 22:00) (32 - 42)  BP: 97/63 (20 Dec 2021 22:00) (93/54 - 184/81)  BP(mean): 75 (20 Dec 2021 22:00) (71 - 92)  ABP: --  ABP(mean): --  RR: 20 (20 Dec 2021 22:00) (15 - 30)  SpO2: 97% (20 Dec 2021 22:00) (97% - 100%)      CVP(mm Hg): --  CO: --  CI: --  PA: --  PA(mean): --  PA(direct): --  PCWP: --  LA: --  RA: --  SVR: --  SVRI: --  PVR: --  PVRI: --    POCT Blood Glucose.: 113 mg/dL (12-20-21 @ 07:57)    CAPILLARY BLOOD GLUCOSE      POCT Blood Glucose.: 113 mg/dL (20 Dec 2021 07:57)      PHYSICAL EXAM:  GENERAL: No acute distress, well-developed  HEAD:  Atraumatic, Normocephalic  EYES: EOMI, PERRLA, conjunctiva and sclera clear  NECK: Supple, no lymphadenopathy, no JVD  CHEST/LUNG: CTAB; No wheezes, rales, or rhonchi  HEART: Regular rate and rhythm. Normal S1/S2. No murmurs, rubs, or gallops  ABDOMEN: Soft, non-tender, non-distended; normal bowel sounds, no organomegaly  EXTREMITIES:  2+ peripheral pulses b/l, No clubbing, cyanosis, or edema  NEUROLOGY: A&O x 3, no focal deficits  SKIN: No rashes or lesions    ============================I/O===========================   I&O's Detail    20 Dec 2021 07:01  -  20 Dec 2021 22:52  --------------------------------------------------------  IN:  Total IN: 0 mL    OUT:    Voided (mL): 625 mL  Total OUT: 625 mL    Total NET: -625 mL        ============================ LABS =========================                        13.2   6.94  )-----------( 127      ( 20 Dec 2021 19:06 )             40.1     12-20    142  |  108  |  21  ----------------------------<  72  4.4   |  21<L>  |  1.11    Ca    9.1      20 Dec 2021 19:06  Phos  2.6     12-20  Mg     2.1     12-20    TPro  6.1  /  Alb  3.9  /  TBili  1.8<H>  /  DBili  x   /  AST  58<H>  /  ALT  65<H>  /  AlkPhos  132<H>  12-20    Troponin T, High Sensitivity Result: <6 ng/L (12-20-21 @ 12:07)  Troponin T, High Sensitivity Result: 6 ng/L (12-20-21 @ 08:45)              LIVER FUNCTIONS - ( 20 Dec 2021 19:06 )  Alb: 3.9 g/dL / Pro: 6.1 g/dL / ALK PHOS: 132 U/L / ALT: 65 U/L / AST: 58 U/L / GGT: x           PT/INR - ( 20 Dec 2021 08:45 )   PT: 11.5 sec;   INR: 0.96 ratio         PTT - ( 20 Dec 2021 08:45 )  PTT:28.0 sec        ======================Micro/Rad/Cardio=================  Telemtry: Reviewed   EKG: Reviewed  CXR: Reviewed  Culture: Reviewed   Echo:   Cath:   ======================================================  PAST MEDICAL & SURGICAL HISTORY:  HLD (hyperlipidemia)    Transient ischemic attack (TIA)    PFO (patent foramen ovale)    No significant past surgical history      ====================ASSESSMENT ==============            Plan:  ====================CARDIOVASCULAR==================    Mechaincal Circulatory Support [ ] IABP,  [ ] Impella 2.5,  [ ] Impella CP  Settings:     ==Hemodynamics==    CVP:   PCWP:  PA S/D:   Cardiac Output:  Cardiac Index:   SVR:      ==Coronaries==    Last ischemic workup:   Antiplatelet regimen:   Anticoagulant:   Statin:   Beta blocker:      ==Pump==    LVEF:                              Regional Wall Motion Abnormaility?:  [ ]Yes   [ ] No, If Yes, Details  Diastolic function:  RV function:   Any change frim prior?: [ ] Yes   [ ] No, If Yes, Details:       ==Rhythm==    Current rhythm:  AM EKG Interpretation:   Anti-arrhythmic therapies:   TVP with settings:         ====================== NEUROLOGY=====================    ==================== RESPIRATORY======================  Mechanical Ventilation:          ===================== RENAL =========================    12-20-21 @ 07:01 - 12-20-21 @ 22:52  --------------------------------------------------------  IN: 0 mL / OUT: 625 mL / NET: -625 mL      Renal Replacement Therapy:  [ ] CRRT      [ ] IHD, Last Session:    Fluid removal:     [ ] Diuretic therapy, Regimen:       ==================== GASTROINTESTINAL===================    Last BM:   Indication for Stress Ulcer Prophylaxis, [ ] Yes    [ ] No   If Yes, Medication:       ========================INFECTIOUS DISEASE================  T(C): 37.1 (12-20-21 @ 20:00), Max: 37.1 (12-20-21 @ 18:12)  WBC Count: 6.94 K/uL (12-20-21 @ 19:06)  WBC Count: 7.51 K/uL (12-20-21 @ 08:45)        Current Antibiotics with start date:       ===================HEMATOLOGIC/ONC ===================  Hemoglobin: 13.2 g/dL (12-20-21 @ 19:06)  Hemoglobin: 14.0 g/dL (12-20-21 @ 08:45)    Platelet Count - Automated: 127 K/uL (12-20-21 @ 19:06)  Platelet Count - Automated: 130 K/uL (12-20-21 @ 08:45)    Chemical VTE Prophylaxis:  [ ] Lovenox    [ ] SQH   [ ]NA  Systemic Anticogaulation:  [ ] Yes    [ ] No,  If Yes, Medication:       =======================    ENDOCRINE  =====================  POCT Blood Glucose.: 113 mg/dL (12-20-21 @ 07:57)            Patient requires continuous monitoring with bedside rhythm monitoring, pulse ox monitoring, and intermittent blood gas analysis. Care plan discussed with ICU care team. Patient remained critical and at risk for life threatening decompensation.  Patient seen, examined and plan discussed with CCU team during rounds.       I have personally provided ____ minutes of critical care time excluding time spent on separate procedures, in addition to initial critical care time provided by the CICU Attending, Dr. Guevara/ Ramírez/ Evangelina/ Emile/ Shaan/ Tammy.       HUMBERTO SMITH  MRN-[id number]  Patient is a 66y old  Male who presents with a chief complaint of AV block (20 Dec 2021 17:58)    HPI:  66M history of TIA, PFO (dx Feb 2021), vertebral stenosis on Xarelto who presents with complaints of losing consciousness since sunday. pt states he has had 3 episodes of LOC, lasting ~1minute. Never had similar symptoms before. Also notes a few days of intermittent exertional chest pain/generalized weakness. No fever, chills, palpitations, SOB.  ED course: Vitals HR 41, /81, satting 99% on RA, RR 16.  patient found to be in 2:1 AVB with episodes of complete heart block. Asymptomatic at rest. Exam and labs unremarkable. EP consulted, patient transferred to CICU for hemodynamic monitoring   Initial trop 6 -> rpt 6. pro bnp 361.  (20 Dec 2021 17:45)      Hospital Course:    24 HOUR EVENTS:    REVIEW OF SYSTEMS:    CONSTITUTIONAL: No weakness, fevers or chills  EYES/ENT: No visual changes;  No vertigo or throat pain   NECK: No pain or stiffness  RESPIRATORY: No cough, wheezing, hemoptysis; No shortness of breath  CARDIOVASCULAR: No chest pain or palpitations  GASTROINTESTINAL: No abdominal or epigastric pain. No nausea, vomiting, or hematemesis; No diarrhea or constipation. No melena or hematochezia.  GENITOURINARY: No dysuria, frequency or hematuria  NEUROLOGICAL: No numbness or weakness  SKIN: No itching, rashes      ICU Vital Signs Last 24 Hrs  T(C): 37.1 (20 Dec 2021 20:00), Max: 37.1 (20 Dec 2021 18:12)  T(F): 98.8 (20 Dec 2021 20:00), Max: 98.8 (20 Dec 2021 18:12)  HR: 36 (20 Dec 2021 22:00) (32 - 42)  BP: 97/63 (20 Dec 2021 22:00) (93/54 - 184/81)  BP(mean): 75 (20 Dec 2021 22:00) (71 - 92)  ABP: --  ABP(mean): --  RR: 20 (20 Dec 2021 22:00) (15 - 30)  SpO2: 97% (20 Dec 2021 22:00) (97% - 100%)      POCT Blood Glucose.: 113 mg/dL (12-20-21 @ 07:57)    CAPILLARY BLOOD GLUCOSE      POCT Blood Glucose.: 113 mg/dL (20 Dec 2021 07:57)      PHYSICAL EXAM:  GENERAL: No acute distress, well-developed  HEAD:  Atraumatic, Normocephalic  EYES: EOMI, PERRLA, conjunctiva and sclera clear  NECK: Supple, no lymphadenopathy, no JVD  CHEST/LUNG: CTAB; No wheezes, rales, or rhonchi  HEART: Regular rate and rhythm. Normal S1/S2. No murmurs, rubs, or gallops  ABDOMEN: Soft, non-tender, non-distended; normal bowel sounds, no organomegaly  EXTREMITIES:  2+ peripheral pulses b/l, No clubbing, cyanosis, or edema  NEUROLOGY: A&O x 3, no focal deficits  SKIN: No rashes or lesions    ============================I/O===========================   I&O's Detail    20 Dec 2021 07:01  -  20 Dec 2021 22:52  --------------------------------------------------------  IN:  Total IN: 0 mL    OUT:    Voided (mL): 625 mL  Total OUT: 625 mL    Total NET: -625 mL        ============================ LABS =========================                        13.2   6.94  )-----------( 127      ( 20 Dec 2021 19:06 )             40.1     12-20    142  |  108  |  21  ----------------------------<  72  4.4   |  21<L>  |  1.11    Ca    9.1      20 Dec 2021 19:06  Phos  2.6     12-20  Mg     2.1     12-20    TPro  6.1  /  Alb  3.9  /  TBili  1.8<H>  /  DBili  x   /  AST  58<H>  /  ALT  65<H>  /  AlkPhos  132<H>  12-20    Troponin T, High Sensitivity Result: <6 ng/L (12-20-21 @ 12:07)  Troponin T, High Sensitivity Result: 6 ng/L (12-20-21 @ 08:45)              LIVER FUNCTIONS - ( 20 Dec 2021 19:06 )  Alb: 3.9 g/dL / Pro: 6.1 g/dL / ALK PHOS: 132 U/L / ALT: 65 U/L / AST: 58 U/L / GGT: x           PT/INR - ( 20 Dec 2021 08:45 )   PT: 11.5 sec;   INR: 0.96 ratio         PTT - ( 20 Dec 2021 08:45 )  PTT:28.0 sec        ======================Micro/Rad/Cardio=================  Telemtry: Reviewed   EKG: Reviewed  CXR: Reviewed  Culture: Reviewed   Echo:   Cath:   ======================================================  PAST MEDICAL & SURGICAL HISTORY:  HLD (hyperlipidemia)    Transient ischemic attack (TIA)    PFO (patent foramen ovale)    No significant past surgical history      ====================ASSESSMENT ==============            Plan:  ====================CARDIOVASCULAR==================  AV block  -holding AV tomas blockers   -no need for TVP at this time, hemodynamically stable   -NPO for PPM in the AM  -Pt needs CT coronaries in the AM prior to PPM        ====================== NEUROLOGY=====================  A+Ox3   -no focal deficits     ==================== RESPIRATORY======================  Saturating >94% on RA  -no active issues       ===================== RENAL =========================  No active issues   -Trending BUN/Scr, normal at 1.11    12-20-21 @ 07:01  -  12-20-21 @ 22:52  --------------------------------------------------------  IN: 0 mL / OUT: 625 mL / NET: -625 mL      Renal Replacement Therapy:  [ ] CRRT      [ ] IHD, Last Session:    Fluid removal:     [ ] Diuretic therapy, Regimen:       ==================== GASTROINTESTINAL===================  NPO post midnight for PPM  -no active issues     Last BM:   Indication for Stress Ulcer Prophylaxis, [ ] Yes    [ ] No   If Yes, Medication:       ========================INFECTIOUS DISEASE================  Afebrile  -WBC within normal   -Monitor off antibiotics     T(C): 37.1 (12-20-21 @ 20:00), Max: 37.1 (12-20-21 @ 18:12)  WBC Count: 6.94 K/uL (12-20-21 @ 19:06)  WBC Count: 7.51 K/uL (12-20-21 @ 08:45)        Current Antibiotics with start date:       ===================HEMATOLOGIC/ONC ===================  Stable H/H  -no active issues     Hemoglobin: 13.2 g/dL (12-20-21 @ 19:06)  Hemoglobin: 14.0 g/dL (12-20-21 @ 08:45)    Platelet Count - Automated: 127 K/uL (12-20-21 @ 19:06)  Platelet Count - Automated: 130 K/uL (12-20-21 @ 08:45)    Chemical VTE Prophylaxis:  [ ] Lovenox    [ ] SQH   [ ]NA  Systemic Anticogaulation:  [ ] Yes    [ ] No,  If Yes, Medication:       =======================    ENDOCRINE  =====================  FS within normal   -no active issues     POCT Blood Glucose.: 113 mg/dL (12-20-21 @ 07:57)            Patient requires continuous monitoring with bedside rhythm monitoring, pulse ox monitoring, and intermittent blood gas analysis. Care plan discussed with ICU care team. Patient remained critical and at risk for life threatening decompensation.  Patient seen, examined and plan discussed with CCU team during rounds.       I have personally provided __30_ minutes of critical care time excluding time spent on separate procedures, in addition to initial critical care time provided by the CICU Attending, Dr. Hutchinson.

## 2021-12-20 NOTE — H&P ADULT - ASSESSMENT
ASSESSMENT AND PLAN:  66M history of TIA, PFO (dx Feb 2021), vertebral stenosis on Xarelto who presents with multiple syncopal episodes found to be in 2:1 AVB, plan for PPM tomorrow. Admitted to CCU for hemodynamic monitoring     #Neuro  - aaox4  - no acute interventions    #Cardiovascular  2-1 AV block with intermittent complete block  -currently asymptomatic  - Trop 6, pro-  -Keep transcutaneous pacer pads on patient.   -No plan for TVP placement at this time.   -Hold home xarelto.   -Hold any AVN blocking agents (CCB, BB, digoxin, amiodarone, adenosine).  -NPO post MN for PPM placement 12/21    #Respiratory  - CXR negative for pulmonary disease  - Satting 99% on RA    #GI/Nutrition  - NPO at MN    #/Renal  - Monitor I&Os  - baseline Cr 1.24    #Heme  - H&H stable    #Endocrine  - Monitor glucose trends  - Check A1c, TSH    #Hematologic/DVT ppx  - SCDs    #Ethics  - Full code

## 2021-12-20 NOTE — ED PROVIDER NOTE - PHYSICAL EXAMINATION
Physical Exam:  Gen: NAD, AOx3, non-toxic appearing  Head: NCAT  HEENT: EOMI, PEERLA, normal conjunctiva, tongue midline, oral mucosa moist  Lung: CTAB, no respiratory distress, no wheezes/rhonchi/rales B/L, speaking in full sentences  CV: bradycardic, no murmurs, rubs or gallops, distal pulses 2+ b/l  Abd: soft, NT, ND, no guarding, no rigidity, no rebound tenderness, no CVA tenderness   MSK: no visible deformities, ROM normal in UE/LE, no back TTP  Neuro: No focal sensory or motor deficits  Skin: Warm, well perfused, no rash, no leg swelling  Psych: normal affect, calm

## 2021-12-20 NOTE — PROCEDURE NOTE - ADDITIONAL PROCEDURE DETAILS
Indication: dizziness    Battery status: "good"    Episodes: 25 - available EGMs reviewed. All consistent with sinus with variable AVB, rates ~30. Pt symptomatic during episodes, near syncopal.     He is NPO for PPM implant tomorrow.     - CURLY Sam

## 2021-12-20 NOTE — ED ADULT NURSE NOTE - NSICDXPASTMEDICALHX_GEN_ALL_CORE_FT
PAST MEDICAL HISTORY:  HLD (hyperlipidemia)     PFO (patent foramen ovale)     Transient ischemic attack (TIA)

## 2021-12-20 NOTE — ED ADULT TRIAGE NOTE - CHIEF COMPLAINT QUOTE
pt started with symptoms of dizziness for 2 months ans today pain in his head and fell pt states he passed out pt woke up this morning and fell  pt appears well  no deficits noted no weakness pt heart rate 42 rate  pt had heart surgery 2 weeks ago for a hole in Cassy

## 2021-12-20 NOTE — H&P ADULT - NSHPLABSRESULTS_GEN_ALL_CORE
====================  Labs & Imaging:   CBC Full  -  ( 20 Dec 2021 08:45 )  WBC Count : 7.51 K/uL  RBC Count : 5.03 M/uL  Hemoglobin : 14.0 g/dL  Hematocrit : 43.9 %  Platelet Count - Automated : 130 K/uL  Mean Cell Volume : 87.3 fl  Mean Cell Hemoglobin : 27.8 pg  Mean Cell Hemoglobin Concentration : 31.9 gm/dL  Auto Neutrophil # : 5.34 K/uL  Auto Lymphocyte # : 1.57 K/uL  Auto Monocyte # : 0.40 K/uL  Auto Eosinophil # : 0.09 K/uL  Auto Basophil # : 0.07 K/uL  Auto Neutrophil % : 71.2 %  Auto Lymphocyte % : 20.9 %  Auto Monocyte % : 5.3 %  Auto Eosinophil % : 1.2 %  Auto Basophil % : 0.9 %    12-20    142  |  106  |  27<H>  ----------------------------<  106<H>  4.3   |  25  |  1.24    Ca    8.8      20 Dec 2021 08:45  Mg     2.1     12-20    TPro  6.8  /  Alb  4.3  /  TBili  0.9  /  DBili  x   /  AST  108<H>  /  ALT  75<H>  /  AlkPhos  157<H>  12-20    PT/INR - ( 20 Dec 2021 08:45 )   PT: 11.5 sec;   INR: 0.96 ratio         PTT - ( 20 Dec 2021 08:45 )  PTT:28.0 sec

## 2021-12-20 NOTE — H&P ADULT - NSHPPHYSICALEXAM_GEN_ALL_CORE
Vital signs reviewed  GENERAL: Patient nontoxic appearing, NAD  HEAD: NCAT  EYES: Anicteric  ENT: MMM  NECK: Supple, non tender  RESPIRATORY: Normal respiratory effort. CTA B/L. No wheezing, rales, rhonchi  CARDIOVASCULAR: bradycardic, +s1s2  ABDOMEN: Soft. Nondistended. Nontender. No guarding or rebound. No CVA tenderness.  MUSCULOSKELETAL/EXTREMITIES: Brisk cap refill. 2+ radial pulses. No leg edema.  SKIN:  Warm and dry  NEURO: AAOx3. No gross FND.  PSYCHIATRIC: Cooperative. Affect appropriate.

## 2021-12-20 NOTE — CONSULT NOTE ADULT - SUBJECTIVE AND OBJECTIVE BOX
Cardiac Electrophysiology Consult Note    Patient seen and evaluated at bedside    Chief Complaint: Presyncope    HPI:  66M history of TIA, PFO (dx Feb 2021), vertebral stenosis on rivaroxaban who presents with pre-syncope. Denies any LOC. No fever, chills, CP, palpitations, SOB. In the ED, patient found to be in 2:1 AVB, HR 30s, /80s. Asymptomatic at rest. Exam and labs unremarkable. EP and CCU consulted.     PMHx:   HLD (hyperlipidemia)  Transient ischemic attack (TIA)  PFO (patent foramen ovale)    PSHx:   No significant past surgical history    Allergies:  No Known Allergies    Home Meds:  Pending admission medication reconcilliaiton    Current Medications:   chlorhexidine 4% Liquid 1 Application(s) Topical <User Schedule>      FAMILY HISTORY:  Noncontributory    Social History:  Nonsmoker, no drug or alcohol use    REVIEW OF SYSTEMS:  Constitutional:     [x ] negative [ ] fevers [ ] chills [ ] weight loss [ ] weight gain  HEENT:                  [x ] negative [ ] dry eyes [ ] eye irritation [ ] postnasal drip [ ] nasal congestion  CV:                         [ x] negative  [ ] chest pain [ ] orthopnea [ ] palpitations [ ] murmur  Resp:                     [x ] negative [ ] cough [ ] shortness of breath [ ] dyspnea [ ] wheezing [ ] sputum [ ]hemoptysis  GI:                          [ x] negative [ ] nausea [ ] vomiting [ ] diarrhea [ ] constipation [ ] abd pain [ ] dysphagia   :                        [ x] negative [ ] dysuria [ ] nocturia [ ] hematuria [ ] increased urinary frequency  Musculoskeletal: [x ] negative [ ] back pain [ ] myalgias [ ] arthralgias [ ] fracture  Skin:                       [ x] negative [ ] rash [ ] itch  Neurological:        [ ] negative [ ] headache [x] dizziness [ ] syncope [ ] weakness [ ] numbness  Psychiatric:           [ x] negative [ ] anxiety [ ] depression  Endocrine:            [ x] negative [ ] diabetes [ ] thyroid problem  Heme/Lymph:      [ x] negative [ ] anemia [ ] bleeding problem  Allergic/Immune: [ x] negative [ ] itchy eyes [ ] nasal discharge [ ] hives [ ] angioedema    Physical Exam:  T(F): 98 (12-20), Max: 98.2 (12-20)  HR: 35 (12-20) (33 - 42)  BP: 124/62 (12-20) (124/62 - 184/81)  RR: 16 (12-20)  SpO2: 100% (12-20)  General: Alert, no acute distress, appears comfortable   HEENT: No scleral icterus, EOMI, no facial dysmorphia, no external ear lesions   Cardiac: Bradycardic, but regular. No murmurs, rubs, or gallops.   Pulmonary: Clear breath sounds throughout, no wheezing, no stridor, no crackles   Abdomen: Nondistended, nontender, appears soft   Skin: no obvious rash or lesions   Extremities: no LE edema  Neurological: Moving all 4 extremities, no overt focal deficits noted   Psych: normal mood and affect     Cardiovascular Diagnostic Testing:    ECG: Personally reviewed:  2:1 AV block    Echo: Personally reviewed:  TTE 2/21/21  Dimensions:    Normal Values:  LA:     3.5    2.0 - 4.0 cm  Ao:     3.7    2.0 - 3.8 cm  SEPTUM: 0.8   0.6 - 1.2 cm  PWT:    0.9    0.6 - 1.1 cm  LVIDd:  4.7    3.0 - 5.6 cm  LVIDs:  2.7    1.8 - 4.0 cm  Derived variables:  LVMI: 69 g/m2  RWT: 0.38  Fractional short: 43 %  EF (Visual Estimate): 70-75 %  Doppler Peak Velocity (m/sec): AoV=1.1  ------------------------------------------------------------------------  Observations:  Mitral Valve: Mitral annular calcification, otherwise  normal mitral valve. Minimal mitral regurgitation.  Aortic Valve/Aorta: Calcified trileaflet aortic valve with  normal opening. Peak transaortic valve gradient equals 5 mm  Hg. No aortic valve regurgitation seen.  Aortic Root: 3.7 cm.  Left Atrium: Normal left atrium.  LA volume index = 16  cc/m2.  Left Ventricle: Normal left ventricular systolic function.  No segmental wall motion abnormalities. Normal left  ventricular internal dimensions and wall thicknesses. Mild  diastolic dysfunction (Stage I).  Right Heart: Normal right atrium. Normal right ventricular  size and function. Normal tricuspid valve. Minimal  tricuspid regurgitation. Normal pulmonic valve. No pulmonic  regurgitation.  Pericardium/Pleura: Normal pericardium with no pericardial  effusion.  Hemodynamic: Estimated right ventricular systolic pressure  equals 9 mm Hg, assuming right atrial pressure equals 3 mm  Hg, consistent with normal pulmonary pressures. Agitated  saline injection demonstrates evidence of a patent foramen  ovale.  ------------------------------------------------------------------------  Conclusions:  1. Mitral annular calcification, otherwise normal mitral  valve. Minimal mitral regurgitation.  2. Calcified trileaflet aortic valve with normal opening.  No aortic valve regurgitation seen.  3. Normal left ventricular systolic function. No segmental  wall motion abnormalities.  4. Mild diastolic dysfunction (Stage I).  5. Normal right ventricular size and function.  6. Agitated saline injection demonstrates evidence of a  patent foramen ovale.  *** No previous Echo exam.    Imaging:    CXR: Personally reviewed  Portable Chest 12/20/21  IMPRESSION:    The heart is normalin size. The Lungs are clear. No pleural effusion. No   pneumothorax. A loop recorder is overlying the left lower hemithorax.   Degenerative changes of the thoracic spine. No acute fractures could be   identified. If clinically indicated dedicated rib study should be   obtained.    Labs: Personally reviewed                        14.0   7.51  )-----------( 130      ( 20 Dec 2021 08:45 )             43.9     12-20    142  |  106  |  27<H>  ----------------------------<  106<H>  4.3   |  25  |  1.24    Ca    8.8      20 Dec 2021 08:45  Mg     2.1     12-20    TPro  6.8  /  Alb  4.3  /  TBili  0.9  /  DBili  x   /  AST  108<H>  /  ALT  75<H>  /  AlkPhos  157<H>  12-20    PT/INR - ( 20 Dec 2021 08:45 )   PT: 11.5 sec;   INR: 0.96 ratio      PTT - ( 20 Dec 2021 08:45 )  PTT:28.0 sec  Serum Pro-Brain Natriuretic Peptide: 361 pg/mL (12-20 @ 08:45)        
CHIEF COMPLAINT: dizziness    HISTORY OF PRESENT ILLNESS:  Pt is a 70yo M w/ hx of HLD, CVA (2/2021) TIA (8/2021), PFO closure (~3 weeks ago) s/p ILR implant (10/25/21 - [facility]) followed by Dr. Mas, who presents to ED due to worsening dizziness & near syncope. Pt states he has been feeling dizzy and having syncopal episodes (with LOC ~1-2 secs per patient) for the last week, worsening in intensity. He also notes worsening exertional chest pain and SOB within the last week. Found to be bradycardic ~30s in ED, tele with 2:1 AVB. EP consulted for ?PPM.     Pt feeling well at this time, denies any chest pain/dizziness/palpitations at this time. States since ILR was implanted, no AF/AFL/HB/jaci episodes have been noted. Pt has been feeling well until the last week when his symptoms onset. Pt not on any AV tomas blockers at home.     Allergies  No Known Allergies    MEDICATIONS:  chlorhexidine 4% Liquid 1 Application(s) Topical <User Schedule>    PAST MEDICAL & SURGICAL HISTORY:  HLD (hyperlipidemia)  Transient ischemic attack (TIA)  PFO (patent foramen ovale)      SOCIAL HISTORY:    Prior Tobacco use (~10 years ago)  Occasional EtOH use (~2-3 drinks / week)  Denies drug use      REVIEW OF SYSTEMS:  See HPI. Otherwise, 10 point ROS done and otherwise negative.    PHYSICAL EXAM:  T(C): 36.7 (12-20-21 @ 08:27), Max: 36.8 (12-20-21 @ 07:54)  HR: 35 (12-20-21 @ 14:00) (33 - 42)  BP: 124/62 (12-20-21 @ 14:00) (124/62 - 184/81)  RR: 16 (12-20-21 @ 14:00) (15 - 16)  SpO2: 100% (12-20-21 @ 14:00) (100% - 100%)  Wt(kg): --  I&O's Summary      Appearance: Normal	  Cardiovascular: Bradycardic, no murmurs  Respiratory: Lungs clear to auscultation	  Psychiatry: A & O x 3, Mood & affect appropriate	  Neurologic: Non-focal  Extremities: Nor BLE edema      LABS:	 	    CBC Full  -  ( 20 Dec 2021 08:45 )  WBC Count : 7.51 K/uL  Hemoglobin : 14.0 g/dL  Hematocrit : 43.9 %  Platelet Count - Automated : 130 K/uL  Mean Cell Volume : 87.3 fl  Mean Cell Hemoglobin : 27.8 pg  Mean Cell Hemoglobin Concentration : 31.9 gm/dL  Auto Neutrophil # : 5.34 K/uL  Auto Lymphocyte # : 1.57 K/uL  Auto Monocyte # : 0.40 K/uL  Auto Eosinophil # : 0.09 K/uL  Auto Basophil # : 0.07 K/uL  Auto Neutrophil % : 71.2 %  Auto Lymphocyte % : 20.9 %  Auto Monocyte % : 5.3 %  Auto Eosinophil % : 1.2 %  Auto Basophil % : 0.9 %    12-20    142  |  106  |  27<H>  ----------------------------<  106<H>  4.3   |  25  |  1.24    Ca    8.8      20 Dec 2021 08:45  Mg     2.1     12-20    TPro  6.8  /  Alb  4.3  /  TBili  0.9  /  DBili  x   /  AST  108<H>  /  ALT  75<H>  /  AlkPhos  157<H>  12-20      proBNP: Serum Pro-Brain Natriuretic Peptide: 361 pg/mL (12-20 @ 08:45)    TSH: Thyroid Stimulating Hormone, Serum (02.21.21 @ 08:56)    Thyroid Stimulating Hormone, Serum: 0.48 uIU/mL      CARDIAC MARKERS: Troponin T, High Sensitivity (12.20.21 @ 12:07)    Troponin T, High Sensitivity Result: <6: Specimen not hemolyzed      TELEMETRY: 2:1 HB rates ~35     RADIOLOGY: < from: Xray Chest 1 View- PORTABLE-Urgent (12.20.21 @ 08:31) >  The heart is normalin size. The Lungs are clear. No pleural effusion. No   pneumothorax. A loop recorder is overlying the left lower hemithorax.   Degenerative changes of the thoracic spine. No acute fractures could be   identified. If clinically indicated dedicated rib study should be   obtained.    < end of copied text >    OTHER: 	    PREVIOUS DIAGNOSTIC TESTING:    [ ] Echocardiogram: < from: TTE with Doppler (w/3D Echo) (Transthoracic Echocardiogram) (02.21.21 @ 09:10) >  Dimensions:    Normal Values:  LA:     3.5    2.0 - 4.0 cm  Ao:     3.7    2.0 - 3.8 cm  SEPTUM: 0.8   0.6 - 1.2 cm  PWT:    0.9    0.6 - 1.1 cm  LVIDd:  4.7    3.0 - 5.6 cm  LVIDs:  2.7    1.8 - 4.0 cm  Derived variables:  LVMI: 69 g/m2  RWT: 0.38  Fractional short: 43 %  EF (Visual Estimate): 70-75 %  Doppler Peak Velocity (m/sec): AoV=1.1  ------------------------------------------------------------------------  Observations:  Mitral Valve: Mitral annular calcification, otherwise  normal mitral valve. Minimal mitral regurgitation.  Aortic Valve/Aorta: Calcified trileaflet aortic valve with  normal opening. Peak transaortic valve gradient equals 5 mm  Hg. No aortic valve regurgitation seen.  Aortic Root: 3.7 cm.  Left Atrium: Normal left atrium.  LA volume index = 16  cc/m2.  Left Ventricle: Normal left ventricular systolic function.  No segmental wall motion abnormalities. Normal left  ventricular internal dimensions and wall thicknesses. Mild  diastolic dysfunction (Stage I).  Right Heart: Normal right atrium. Normal right ventricular  size and function. Normal tricuspid valve. Minimal  tricuspid regurgitation. Normal pulmonic valve. No pulmonic  regurgitation.  Pericardium/Pleura: Normal pericardium with no pericardial  effusion.  Hemodynamic: Estimated right ventricular systolic pressure  equals 9 mm Hg, assuming right atrial pressure equals 3 mm  Hg, consistent with normal pulmonary pressures. Agitated  saline injection demonstrates evidence of a patent foramen  ovale.  ------------------------------------------------------------------------  Conclusions:  1. Mitral annular calcification, otherwise normal mitral  valve. Minimal mitral regurgitation.  2. Calcified trileaflet aortic valve with normal opening.  No aortic valve regurgitation seen.  3. Normal left ventricular systolic function. No segmental  wall motion abnormalities.  4. Mild diastolic dysfunction (Stage I).  5. Normal right ventricular size and function.  6. Agitated saline injection demonstrates evidence of a  patent foramen ovale.  *** No previous Echo exam.    < end of copied text >    
Patient seen and evaluated at bedside    Chief Complaint: Presyncope     HPI:  66M history of TIA, PFO (dx Feb 2021), vertebral stenosis on Xarelto who presents with pre-syncope. Denies any LOC. No fever, chills, CP, palpitations, SOB. In the ED, patient found to be in 2:1 AVB, HR 30s, /80s. Asymptomatic at rest. Exam and labs unremarkable. EP consulted and CICU called as well.     PMHx:   HLD (hyperlipidemia)    Transient ischemic attack (TIA)    PFO (patent foramen ovale)        PSHx:   No significant past surgical history    No significant past surgical history        Allergies:  No Known Allergies      Home Meds: As per admission medication reconcilliation.     Current Medications:       FAMILY HISTORY:      Social History:    REVIEW OF SYSTEMS:  Constitutional:     [x ] negative [ ] fevers [ ] chills [ ] weight loss [ ] weight gain  HEENT:                  [x ] negative [ ] dry eyes [ ] eye irritation [ ] postnasal drip [ ] nasal congestion  CV:                         [ x] negative  [ ] chest pain [ ] orthopnea [ ] palpitations [ ] murmur  Resp:                     [x ] negative [ ] cough [ ] shortness of breath [ ] dyspnea [ ] wheezing [ ] sputum [ ]hemoptysis  GI:                          [ x] negative [ ] nausea [ ] vomiting [ ] diarrhea [ ] constipation [ ] abd pain [ ] dysphagia   :                        [ x] negative [ ] dysuria [ ] nocturia [ ] hematuria [ ] increased urinary frequency  Musculoskeletal: [x ] negative [ ] back pain [ ] myalgias [ ] arthralgias [ ] fracture  Skin:                       [ x] negative [ ] rash [ ] itch  Neurological:        [ x] negative [ ] headache [ ] dizziness [ ] syncope [ ] weakness [ ] numbness  Psychiatric:           [ x] negative [ ] anxiety [ ] depression  Endocrine:            [ x] negative [ ] diabetes [ ] thyroid problem  Heme/Lymph:      [ x] negative [ ] anemia [ ] bleeding problem  Allergic/Immune: [ x] negative [ ] itchy eyes [ ] nasal discharge [ ] hives [ ] angioedema    [ x] All other systems negative  [ ] Unable to assess ROS due to      Physical Exam:  T(F): 98 (12-20), Max: 98.2 (12-20)  HR: 35 (12-20) (33 - 42)  BP: 124/62 (12-20) (124/62 - 184/81)  RR: 16 (12-20)  SpO2: 100% (12-20)  General: Alert, no acute distress, appears comfortable   HEENT: No scleral icterus, EOMI, no facial dysmorphia, no external ear lesions   Cardiac: Bradycardic but regular. No murmurs, rubs.   Pulmonary: Clear breath sounds throughout, no wheezing, no stridor, no crackles   Abdomen: Nondistended, nontender, appears soft   Skin: no obvious rash or lesions   Extremities: no LE edema  Neurological: Moving all 4 extremities, no overt focal deficits noted   Psych: normal mood and affect     Cardiovascular Diagnostic Testing:    ECG: Personally reviewed:  2:1 AVB. Possible LAE.     Echo: Personally reviewed:  Conclusions:  1. Mitral annular calcification, otherwise normal mitral  valve. Minimal mitral regurgitation.  2. Calcified trileaflet aortic valve with normal opening.  No aortic valve regurgitation seen.  3. Normal left ventricular systolic function. No segmental  wall motion abnormalities.  4. Mild diastolic dysfunction (Stage I).  5. Normal right ventricular size and function.  6. Agitated saline injection demonstrates evidence of a  patent foramen ovale.  *** No previous Echo exam.      CXR: Personally reviewed    Labs: Personally reviewed                        14.0   7.51  )-----------( 130      ( 20 Dec 2021 08:45 )             43.9     12-20    142  |  106  |  27<H>  ----------------------------<  106<H>  4.3   |  25  |  1.24    Ca    8.8      20 Dec 2021 08:45  Mg     2.1     12-20    TPro  6.8  /  Alb  4.3  /  TBili  0.9  /  DBili  x   /  AST  108<H>  /  ALT  75<H>  /  AlkPhos  157<H>  12-20    PT/INR - ( 20 Dec 2021 08:45 )   PT: 11.5 sec;   INR: 0.96 ratio         PTT - ( 20 Dec 2021 08:45 )  PTT:28.0 sec  Serum Pro-Brain Natriuretic Peptide: 361 pg/mL (12-20 @ 08:45)

## 2021-12-20 NOTE — ED PROVIDER NOTE - OBJECTIVE STATEMENT
65yo male HLD, TIA & CVA, PFO on plavix & xarelto? p/w 2 months intermittent dizziness/lightheadedness and near syncope this AM, did not have full syncope or fall/head trauma. Also notes a few days of intermittent exertional chest pain/generalizedweakness. Found to be bradycardic in triage and initial EKG concerning for high degree block. Denies current chest pain, focal weakness/numbness, change in vision, headache.

## 2021-12-20 NOTE — CONSULT NOTE ADULT - ASSESSMENT
Pt is a 68yo M w/ hx of HLD, CVA (2/2021) TIA (8/2021), PFO closure (~3 weeks ago) s/p MDT ILR implant (10/25/21 - [facility]) followed by Dr. Mas, who presents to ED due to worsening dizziness & near syncope. Pt states he has been feeling dizzy and having syncopal episodes (with LOC ~1-2 secs per patient) for the last week, worsening in intensity. He also notes worsening exertional chest pain and SOB within the last week. Found to be bradycardic ~30s in ED, tele with 2:1 AVB. EP consulted for PPM evaluation.     Pt feeling well at this time, denies any chest pain/dizziness/palpitations at this time. States since ILR was implanted, no AF/AFL/HB/jaci episodes have been noted. Pt has been feeling well until the last week when his symptoms onset. Pt not on any AV tomas blockers at home.     1. 2:1 HB  2. CVA - on ASA & Plavix  3. HLD    # Tele with 2:1 HB, rates increase up to ~45. Pt asymptomatic at this time.   - Hemodynamically stable, no need for TVP at this time.   ILR interrogated with multiple episodes of AVB (without ventricular escape up to ~7 secs) from 12/19-12/20.   Received call from Dr. Mas's office, interrogation faxed to office. He is in agreement with PPM implant.   Details of PPM were discussed with patient including risks & benefits. He would like to proceed with PPM.   - Keep NPO after midnight for dual chamber PPM in AM. Will explant ILR during procedure. Consent obtained.   - No heparin/lovenox in 12/21 AM.   - T&S and COVID active  - NO AV tomas blockers.     # Exertional chest pain - recommend CT coronaries.     Discussed with EP attending and primary team.  Pt is a 70yo M w/ hx of HLD, CVA (2/2021) TIA (8/2021), PFO closure (~3 weeks ago) s/p MDT ILR implant (10/25/21 - [facility]) followed by Dr. Mas, who presents to ED due to worsening dizziness & near syncope. Pt states he has been feeling dizzy and having syncopal episodes (with LOC ~1-2 secs per patient) for the last week, worsening in intensity. He also notes worsening exertional chest pain and SOB within the last week. Found to be bradycardic ~30s in ED, tele with 2:1 AVB. EP consulted for PPM evaluation.     Pt feeling well at this time, denies any chest pain/dizziness/palpitations at this time. States since ILR was implanted, no AF/AFL/HB/jaci episodes have been noted. Pt has been feeling well until the last week when his symptoms onset. Pt not on any AV tomas blockers at home.     1. 2:1 HB  2. CVA - on ASA & Plavix  3. HLD    # Tele with 2:1 HB, rates increase up to ~45. Pt asymptomatic at this time.   - Hemodynamically stable, no need for TVP at this time.   ILR interrogated with multiple episodes of AVB (without ventricular escape up to ~7 secs) from 12/19-12/20.   Received call from Dr. Mas's office, interrogation faxed to office. He is in agreement with PPM implant.   Details of PPM were discussed with patient including risks & benefits. He would like to proceed with PPM.   - Keep NPO after midnight for dual chamber PPM in AM. Will explant ILR during procedure. Consent obtained.   - No heparin/lovenox in 12/21 AM. Pt states he is NOT on AC at home.   - T&S and COVID active  - NO AV tomas blockers.     # Exertional chest pain - recommend CT coronaries.     Discussed with EP attending and primary team.

## 2021-12-20 NOTE — CONSULT NOTE ADULT - ASSESSMENT
66M history of TIA, PFO (dx Feb 2021), vertebral stenosis on Xarelto who presents with pre-syncope, found to be in 2:1 AVB, plan for PPM tomorrow.     #2:1 AVB - No clear reversible cause. Currently HDS and asymptomatic.      Plan:  -Admit to CICU for more careful monitoring.   -Keep transcutaneous pacer pads on patient.   -No plan for TVP placement at this time.   -Hold home xarelto.   -Hold any AVN blocking agents (CCB, BB, digoxin, amiodarone, adenosine).  -NPO post MN.  -EP on board.     Marlon Gilbert MD  Cardiology Fellow - PGY 4  For all New Consults and Questions:  [email or web address]   Login: [email or web address]

## 2021-12-20 NOTE — ED PROVIDER NOTE - CLINICAL SUMMARY MEDICAL DECISION MAKING FREE TEXT BOX
67yo male p/w near syncope, intermittent chest pain found to have high degree AV block on ekg. Hypertensive, no neuro deficits or reports of trauma. EP, labs, admit.

## 2021-12-20 NOTE — H&P ADULT - NSHPREVIEWOFSYSTEMS_GEN_ALL_CORE
Constitutional: No fever, chills.  Eyes:  No visual changes  ENMT:  No neck pain  Cardiac:  +chest pain  Respiratory:  No cough, SOB  GI:  No nausea, vomiting, diarrhea, abdominal pain.  :  No dysuria, hematuria  MS:  No back pain.  Neuro:  +syncope

## 2021-12-20 NOTE — CONSULT NOTE ADULT - ASSESSMENT
66M history of TIA, PFO (dx Feb 2021), vertebral stenosis on rivaroxaban who presents with pre-syncope, found to be in 2:1 AVB.    #2:1 AV Block  Recommendations:  1. Hold anticoagulation  2. Hold all AV tomas blocking agents   3. NPO at midnight for PPM tomorrow  4. Monitor in CCU with transcutaneous pacers on    René Burr MD  Cardiology Fellow, PGY4  Cell: [phone]    This is an overnight/weekend consult. During regular weekday hospital hours please contact appropriate day consult fellow. Contact information can be obtained on [employer] (Login: [email or web address]).

## 2021-12-20 NOTE — H&P ADULT - HISTORY OF PRESENT ILLNESS
66M history of TIA, PFO (dx Feb 2021), vertebral stenosis on Xarelto who presents with complaints of losing consciousness since sunday. pt states he has had 3 episodes of LOC, lasting ~1minute. Never had similar symptoms before. Also notes a few days of intermittent exertional chest pain/generalized weakness. No fever, chills, palpitations, SOB.  ED course: Vitals HR 41, /81, satting 99% on RA, RR 16.  patient found to be in 2:1 AVB with episodes of complete heart block. Asymptomatic at rest. Exam and labs unremarkable. EP consulted, patient transferred to CDU for hemodynamic monitoring   Initial trop 6 -> rpt 6. pro bnp [id number].

## 2021-12-21 PROBLEM — G45.9 TRANSIENT CEREBRAL ISCHEMIC ATTACK, UNSPECIFIED: Chronic | Status: ACTIVE | Noted: 2021-12-20

## 2021-12-21 PROBLEM — Q21.1 ATRIAL SEPTAL DEFECT: Chronic | Status: ACTIVE | Noted: 2021-12-20

## 2021-12-21 LAB
A1C WITH ESTIMATED AVERAGE GLUCOSE RESULT: 5.6 % — SIGNIFICANT CHANGE UP (ref 4–5.6)
ALBUMIN SERPL ELPH-MCNC: 3.6 G/DL — SIGNIFICANT CHANGE UP (ref 3.3–5)
ALP SERPL-CCNC: 117 U/L — SIGNIFICANT CHANGE UP (ref 40–120)
ALT FLD-CCNC: 60 U/L — HIGH (ref 10–45)
ANION GAP SERPL CALC-SCNC: 11 MMOL/L — SIGNIFICANT CHANGE UP (ref 5–17)
AST SERPL-CCNC: 50 U/L — HIGH (ref 10–40)
BASOPHILS # BLD AUTO: 0.05 K/UL — SIGNIFICANT CHANGE UP (ref 0–0.2)
BASOPHILS NFR BLD AUTO: 0.8 % — SIGNIFICANT CHANGE UP (ref 0–2)
BILIRUB SERPL-MCNC: 1.3 MG/DL — HIGH (ref 0.2–1.2)
BLD GP AB SCN SERPL QL: NEGATIVE — SIGNIFICANT CHANGE UP
BUN SERPL-MCNC: 28 MG/DL — HIGH (ref 7–23)
CALCIUM SERPL-MCNC: 8.6 MG/DL — SIGNIFICANT CHANGE UP (ref 8.4–10.5)
CHLORIDE SERPL-SCNC: 109 MMOL/L — HIGH (ref 96–108)
CHOLEST SERPL-MCNC: 131 MG/DL — SIGNIFICANT CHANGE UP
CO2 SERPL-SCNC: 22 MMOL/L — SIGNIFICANT CHANGE UP (ref 22–31)
COVID-19 NUCLEOCAPSID GAM AB INTERP: POSITIVE
COVID-19 NUCLEOCAPSID TOTAL GAM ANTIBODY RESULT: 19.4 INDEX — HIGH
COVID-19 SPIKE DOMAIN AB INTERP: POSITIVE
COVID-19 SPIKE DOMAIN ANTIBODY RESULT: >250 U/ML — HIGH
CREAT SERPL-MCNC: 1.25 MG/DL — SIGNIFICANT CHANGE UP (ref 0.5–1.3)
EOSINOPHIL # BLD AUTO: 0.16 K/UL — SIGNIFICANT CHANGE UP (ref 0–0.5)
EOSINOPHIL NFR BLD AUTO: 2.6 % — SIGNIFICANT CHANGE UP (ref 0–6)
ESTIMATED AVERAGE GLUCOSE: 114 MG/DL — SIGNIFICANT CHANGE UP (ref 68–114)
GLUCOSE SERPL-MCNC: 106 MG/DL — HIGH (ref 70–99)
HCT VFR BLD CALC: 38.8 % — LOW (ref 39–50)
HCV AB S/CO SERPL IA: 0.31 S/CO — SIGNIFICANT CHANGE UP (ref 0–0.99)
HCV AB SERPL-IMP: SIGNIFICANT CHANGE UP
HDLC SERPL-MCNC: 49 MG/DL — SIGNIFICANT CHANGE UP
HGB BLD-MCNC: 12.9 G/DL — LOW (ref 13–17)
IMM GRANULOCYTES NFR BLD AUTO: 0.2 % — SIGNIFICANT CHANGE UP (ref 0–1.5)
LIPID PNL WITH DIRECT LDL SERPL: 66 MG/DL — SIGNIFICANT CHANGE UP
LYMPHOCYTES # BLD AUTO: 2.04 K/UL — SIGNIFICANT CHANGE UP (ref 1–3.3)
LYMPHOCYTES # BLD AUTO: 32.8 % — SIGNIFICANT CHANGE UP (ref 13–44)
MAGNESIUM SERPL-MCNC: 2.1 MG/DL — SIGNIFICANT CHANGE UP (ref 1.6–2.6)
MCHC RBC-ENTMCNC: 28.5 PG — SIGNIFICANT CHANGE UP (ref 27–34)
MCHC RBC-ENTMCNC: 33.2 GM/DL — SIGNIFICANT CHANGE UP (ref 32–36)
MCV RBC AUTO: 85.8 FL — SIGNIFICANT CHANGE UP (ref 80–100)
MONOCYTES # BLD AUTO: 0.51 K/UL — SIGNIFICANT CHANGE UP (ref 0–0.9)
MONOCYTES NFR BLD AUTO: 8.2 % — SIGNIFICANT CHANGE UP (ref 2–14)
NEUTROPHILS # BLD AUTO: 3.45 K/UL — SIGNIFICANT CHANGE UP (ref 1.8–7.4)
NEUTROPHILS NFR BLD AUTO: 55.4 % — SIGNIFICANT CHANGE UP (ref 43–77)
NON HDL CHOLESTEROL: 81 MG/DL — SIGNIFICANT CHANGE UP
NRBC # BLD: 0 /100 WBCS — SIGNIFICANT CHANGE UP (ref 0–0)
PHOSPHATE SERPL-MCNC: 3.2 MG/DL — SIGNIFICANT CHANGE UP (ref 2.5–4.5)
PLATELET # BLD AUTO: 127 K/UL — LOW (ref 150–400)
POTASSIUM SERPL-MCNC: 4.4 MMOL/L — SIGNIFICANT CHANGE UP (ref 3.5–5.3)
POTASSIUM SERPL-SCNC: 4.4 MMOL/L — SIGNIFICANT CHANGE UP (ref 3.5–5.3)
PROT SERPL-MCNC: 5.8 G/DL — LOW (ref 6–8.3)
RBC # BLD: 4.52 M/UL — SIGNIFICANT CHANGE UP (ref 4.2–5.8)
RBC # FLD: 14.3 % — SIGNIFICANT CHANGE UP (ref 10.3–14.5)
RH IG SCN BLD-IMP: POSITIVE — SIGNIFICANT CHANGE UP
SARS-COV-2 IGG+IGM SERPL QL IA: 19.4 INDEX — HIGH
SARS-COV-2 IGG+IGM SERPL QL IA: >250 U/ML — HIGH
SARS-COV-2 IGG+IGM SERPL QL IA: POSITIVE
SARS-COV-2 IGG+IGM SERPL QL IA: POSITIVE
SODIUM SERPL-SCNC: 142 MMOL/L — SIGNIFICANT CHANGE UP (ref 135–145)
TRIGL SERPL-MCNC: 79 MG/DL — SIGNIFICANT CHANGE UP
TSH SERPL-MCNC: 1.24 UIU/ML — SIGNIFICANT CHANGE UP (ref 0.27–4.2)
WBC # BLD: 6.22 K/UL — SIGNIFICANT CHANGE UP (ref 3.8–10.5)
WBC # FLD AUTO: 6.22 K/UL — SIGNIFICANT CHANGE UP (ref 3.8–10.5)

## 2021-12-21 PROCEDURE — 33208 INSRT HEART PM ATRIAL & VENT: CPT

## 2021-12-21 PROCEDURE — 99291 CRITICAL CARE FIRST HOUR: CPT

## 2021-12-21 PROCEDURE — 33286 RMVL SUBQ CAR RHYTHM MNTR: CPT | Mod: 59

## 2021-12-21 PROCEDURE — 93010 ELECTROCARDIOGRAM REPORT: CPT | Mod: 77

## 2021-12-21 PROCEDURE — 93010 ELECTROCARDIOGRAM REPORT: CPT

## 2021-12-21 PROCEDURE — 99024 POSTOP FOLLOW-UP VISIT: CPT

## 2021-12-21 RX ORDER — TICAGRELOR 90 MG/1
90 TABLET ORAL EVERY 12 HOURS
Refills: 0 | Status: DISCONTINUED | OUTPATIENT
Start: 2021-12-21 | End: 2021-12-21

## 2021-12-21 RX ORDER — EPTIFIBATIDE 2 MG/ML
2 INJECTION, SOLUTION INTRAVENOUS
Qty: 75 | Refills: 0 | Status: DISCONTINUED | OUTPATIENT
Start: 2021-12-21 | End: 2021-12-21

## 2021-12-21 RX ORDER — ATORVASTATIN CALCIUM 80 MG/1
80 TABLET, FILM COATED ORAL AT BEDTIME
Refills: 0 | Status: DISCONTINUED | OUTPATIENT
Start: 2021-12-21 | End: 2021-12-21

## 2021-12-21 RX ADMIN — CHLORHEXIDINE GLUCONATE 1 APPLICATION(S): 213 SOLUTION TOPICAL at 06:53

## 2021-12-21 NOTE — PROGRESS NOTE ADULT - ASSESSMENT
ASSESSMENT AND PLAN:  66M history of TIA, PFO (dx Feb 2021), vertebral stenosis on Xarelto who presents with multiple syncopal episodes found to be in 2:1 AVB, plan for PPM tomorrow. Admitted to CCU for hemodynamic monitoring     #Neuro  - aaox4  - no acute interventions    #Cardiovascular  2-1 AV block with intermittent complete block  -currently asymptomatic  - Trop 6, pro-  -Keep transcutaneous pacer pads on patient.   -No plan for TVP placement at this time.   -Hold home xarelto.   -Hold any AVN blocking agents (CCB, BB, digoxin, amiodarone, adenosine).  -NPO post MN for PPM placement 12/21    #Respiratory  - CXR negative for pulmonary disease  - Satting 99% on RA    #GI/Nutrition  - NPO at MN    #/Renal  - Monitor I&Os  - baseline Cr 1.24    #Heme  - H&H stable    #Endocrine  - Monitor glucose trends  - Check A1c, TSH    #Hematologic/DVT ppx  - SCDs    #Ethics  - Full code       ASSESSMENT AND PLAN:  66M history of TIA, PFO (dx Feb 2021), vertebral stenosis on plavix who presents with multiple syncopal episodes found to be in 2:1 AVB, plan for PPM tomorrow. Admitted to CCU for hemodynamic monitoring     #Neuro  - aaox4  - no acute interventions    #Cardiovascular  2-1 AV block with intermittent complete block  -currently asymptomatic  - Trop 6, pro-  -Keep transcutaneous pacer pads on patient.   -No plan for TVP placement at this time.   -Hold home plavix    -Hold any AVN blocking agents (CCB, BB, digoxin, amiodarone, adenosine).  -NPO for PPM placement 12/21    #Respiratory  - CXR negative for pulmonary disease  - Satting 99% on RA    #GI/Nutrition  - NPO    #/Renal  - Monitor I&Os  - baseline Cr 1.24    #Heme  - H&H stable    #Endocrine  - Monitor glucose trends  - Check A1c, TSH    #Hematologic/DVT ppx  - SCDs    #Ethics  - Full code

## 2021-12-21 NOTE — PROGRESS NOTE ADULT - SUBJECTIVE AND OBJECTIVE BOX
24H hour events: Pt has been in [facility] with ventricular escape ~30s since 12/20 ~7pm. Reports minimal lightheadedness. Denies SOB/chest pain/palpitations. Has been NPO for PPM today    MEDICATIONS:  chlorhexidine 4% Liquid 1 Application(s) Topical <User Schedule>      REVIEW OF SYSTEMS:  Complete 10point ROS negative.    PHYSICAL EXAM:  T(C): 36.7 (12-21-21 @ 09:46), Max: 37.2 (12-21-21 @ 00:00)  HR: 33 (12-21-21 @ 10:00) (30 - 41)  BP: 130/58 (12-21-21 @ 10:00) (93/54 - 138/62)  RR: 20 (12-21-21 @ 10:00) (16 - 31)  SpO2: 99% (12-21-21 @ 10:00) (93% - 100%)  Wt(kg): --  I&O's Summary    20 Dec 2021 07:01  -  21 Dec 2021 07:00  --------------------------------------------------------  IN: 240 mL / OUT: 900 mL / NET: -660 mL    21 Dec 2021 07:01  -  21 Dec 2021 10:28  --------------------------------------------------------  IN: 0 mL / OUT: 200 mL / NET: -200 mL        Appearance: Normal	  Cardiovascular: Bradycardic  Respiratory: Lungs clear to auscultation	  Psychiatry: A & O x 3, Mood & affect appropriate  Neurologic: Non-focal  Extremities: No BLE edema.       LABS:	 	    CBC Full  -  ( 21 Dec 2021 01:09 )  WBC Count : 6.22 K/uL  Hemoglobin : 12.9 g/dL  Hematocrit : 38.8 %  Platelet Count - Automated : 127 K/uL  Mean Cell Volume : 85.8 fl  Mean Cell Hemoglobin : 28.5 pg  Mean Cell Hemoglobin Concentration : 33.2 gm/dL  Auto Neutrophil # : 3.45 K/uL  Auto Lymphocyte # : 2.04 K/uL  Auto Monocyte # : 0.51 K/uL  Auto Eosinophil # : 0.16 K/uL  Auto Basophil # : 0.05 K/uL  Auto Neutrophil % : 55.4 %  Auto Lymphocyte % : 32.8 %  Auto Monocyte % : 8.2 %  Auto Eosinophil % : 2.6 %  Auto Basophil % : 0.8 %    12-21    142  |  109<H>  |  28<H>  ----------------------------<  106<H>  4.4   |  22  |  1.25  12-20    142  |  108  |  21  ----------------------------<  72  4.4   |  21<L>  |  1.11    Ca    8.6      21 Dec 2021 01:08  Ca    9.1      20 Dec 2021 19:06  Phos  3.2     12-21  Phos  2.6     12-20  Mg     2.1     12-21  Mg     2.1     12-20    TPro  5.8<L>  /  Alb  3.6  /  TBili  1.3<H>  /  DBili  x   /  AST  50<H>  /  ALT  60<H>  /  AlkPhos  117  12-21  TPro  6.1  /  Alb  3.9  /  TBili  1.8<H>  /  DBili  x   /  AST  58<H>  /  ALT  65<H>  /  AlkPhos  132<H>  12-20      proBNP: Serum Pro-Brain Natriuretic Peptide: 361 pg/mL (12-20 @ 08:45)    TSH: Thyroid Stimulating Hormone, Serum (12.21.21 @ 01:39)    Thyroid Stimulating Hormone, Serum: 1.24 uIU/mL      CARDIAC MARKERS: Troponin T, High Sensitivity (12.20.21 @ 12:07)    Troponin T, High Sensitivity Result: <6: Specimen not hemolyzed    TELEMETRY: CHB, ventricular escape ~30    RADIOLOGY: < from: Xray Chest 1 View- PORTABLE-Urgent (12.20.21 @ 08:31) >    The heart is normalin size. The Lungs are clear. No pleural effusion. No   pneumothorax. A loop recorder is overlying the left lower hemithorax.   Degenerative changes of the thoracic spine. No acute fractures could be   identified. If clinically indicated dedicated rib study should be   obtained.    < end of copied text >  	    PREVIOUS DIAGNOSTIC TESTING:    [ ] Echocardiogram: < from: TTE with Doppler (w/3D Echo) (Transthoracic Echocardiogram) (02.21.21 @ 09:10) >  Dimensions:    Normal Values:  LA:     3.5    2.0 - 4.0 cm  Ao:     3.7    2.0 - 3.8 cm  SEPTUM: 0.8   0.6 - 1.2 cm  PWT:    0.9    0.6 - 1.1 cm  LVIDd:  4.7    3.0 - 5.6 cm  LVIDs:  2.7    1.8 - 4.0 cm  Derived variables:  LVMI: 69 g/m2  RWT: 0.38  Fractional short: 43 %  EF (Visual Estimate): 70-75 %  Doppler Peak Velocity (m/sec): AoV=1.1  ------------------------------------------------------------------------  Observations:  Mitral Valve: Mitral annular calcification, otherwise  normal mitral valve. Minimal mitral regurgitation.  Aortic Valve/Aorta: Calcified trileaflet aortic valve with  normal opening. Peak transaortic valve gradient equals 5 mm  Hg. No aortic valve regurgitation seen.  Aortic Root: 3.7 cm.  Left Atrium: Normal left atrium.  LA volume index = 16  cc/m2.  Left Ventricle: Normal left ventricular systolic function.  No segmental wall motion abnormalities. Normal left  ventricular internal dimensions and wall thicknesses. Mild  diastolic dysfunction (Stage I).  Right Heart: Normal right atrium. Normal right ventricular  size and function. Normal tricuspid valve. Minimal  tricuspid regurgitation. Normal pulmonic valve. No pulmonic  regurgitation.  Pericardium/Pleura: Normal pericardium with no pericardial  effusion.  Hemodynamic: Estimated right ventricular systolic pressure  equals 9 mm Hg, assuming right atrial pressure equals 3 mm  Hg, consistent with normal pulmonary pressures. Agitated  saline injection demonstrates evidence of a patent foramen  ovale.  ------------------------------------------------------------------------  Conclusions:  1. Mitral annular calcification, otherwise normal mitral  valve. Minimal mitral regurgitation.  2. Calcified trileaflet aortic valve with normal opening.  No aortic valve regurgitation seen.  3. Normal left ventricular systolic function. No segmental  wall motion abnormalities.  4. Mild diastolic dysfunction (Stage I).  5. Normal right ventricular size and function.  6. Agitated saline injection demonstrates evidence of a  patent foramen ovale.  *** No previous Echo exam.    < end of copied text >

## 2021-12-21 NOTE — PROGRESS NOTE ADULT - SUBJECTIVE AND OBJECTIVE BOX
Staci Bourgeois, PGY1  [facility] Service  Internal Medicine  Pager: [phone] (NS)    PATIENT: HUMBERTO SMITH, MRN: [id number]    CHIEF COMPLAINT: Patient is a 66y old  Male who presents with a chief complaint of AV block (20 Dec 2021 22:52)      INTERVAL HISTORY/OVERNIGHT EVENTS: No overnight events. At bedside, patient has been sleeping and eating well. Denies N/V/D/C. Last BM x1 regular yesterday. Denies abdominal pain. Denies chest pain or SOB, cough. Has been ambulating with assistance. Oriented to person, place, and time. Breathing comfortably on room air.    REVIEW OF SYSTEMS:    Constitutional:     [ ] negative [ ] fevers [ ] chills [ ] weight loss [ ] weight gain  HEENT:                  [ ] negative [ ] dry eyes [ ] eye irritation [ ] postnasal drip [ ] nasal congestion  CV:                         [ ] negative  [ ] chest pain [ ] orthopnea [ ] palpitations [ ] murmur  Resp:                     [ ] negative [ ] cough [ ] shortness of breath [ ] dyspnea [ ] wheezing [ ] sputum [ ] hemoptysis  GI:                          [ ] negative [ ] nausea [ ] vomiting [ ] diarrhea [ ] constipation [ ] abd pain [ ] dysphagia   :                        [ ] negative [ ] dysuria [ ] nocturia [ ] hematuria [ ] increased urinary frequency  Musculoskeletal: [ ] negative [ ] back pain [ ] myalgias [ ] arthralgias [ ] fracture  Skin:                       [ ] negative [ ] rash [ ] itch  Neurological:        [ ] negative [ ] headache [ ] dizziness [ ] syncope [ ] weakness [ ] numbness  Psychiatric:           [ ] negative [ ] anxiety [ ] depression  Endocrine:            [ ] negative [ ] diabetes [ ] thyroid problem  Heme/Lymph:      [ ] negative [ ] anemia [ ] bleeding problem  Allergic/Immune: [ ] negative [ ] itchy eyes [ ] nasal discharge [ ] hives [ ] angioedema    [ ] All other systems negative  [ ] Unable to assess ROS because ________.    MEDICATIONS:  MEDICATIONS  (STANDING):  chlorhexidine 4% Liquid 1 Application(s) Topical <User Schedule>    MEDICATIONS  (PRN):      ALLERGIES: Allergies    No Known Allergies    Intolerances        OBJECTIVE:  ICU Vital Signs Last 24 Hrs  T(C): 36.7 (21 Dec 2021 07:00), Max: 37.2 (21 Dec 2021 00:00)  T(F): 98 (21 Dec 2021 07:00), Max: 99 (21 Dec 2021 00:00)  HR: 32 (21 Dec 2021 07:00) (30 - 41)  BP: 111/57 (21 Dec 2021 07:00) (93/54 - 138/72)  BP(mean): 81 (21 Dec 2021 07:00) (71 - 92)  ABP: --  ABP(mean): --  RR: 26 (21 Dec 2021 07:00) (16 - 31)  SpO2: 99% (21 Dec 2021 07:00) (93% - 100%)      Adult Advanced Hemodynamics Last 24 Hrs  CVP(mm Hg): --  CVP(cm H2O): --  CO: --  CI: --  PA: --  PA(mean): --  PCWP: --  SVR: --  SVRI: --  PVR: --  PVRI: --  CAPILLARY BLOOD GLUCOSE        CAPILLARY BLOOD GLUCOSE      POCT Blood Glucose.: 113 mg/dL (20 Dec 2021 07:57)    I&O's Summary    20 Dec 2021 07:01  -  21 Dec 2021 07:00  --------------------------------------------------------  IN: 240 mL / OUT: 900 mL / NET: -660 mL      Daily Height in cm: 167.64 (20 Dec 2021 18:12)    Daily Weight in k (21 Dec 2021 06:00)    PHYSICAL EXAMINATION:  General: Comfortable, no acute distress, cooperative with exam.  HEENT: PERRLA, EOMI, moist mucous membranes.  Respiratory: CTAB, normal respiratory effort, no coughing, wheezes, crackles, or rales.  CV: RRR, S1S2, no murmurs, rubs or gallops. No JVD. Distal pulses intact.  Abdominal: Soft, nontender, nondistended, no rebound or guarding, normal bowel sounds.  Neurology: AOx3, no focal neuro defects, BRYAN x 4.  Extremities: No pitting edema, + Peripheral pulses.  Incisions:   Tubes:    LABS:                          12.9   6.22  )-----------( 127      ( 21 Dec 2021 01:09 )             38.8     12    142  |  109<H>  |  28<H>  ----------------------------<  106<H>  4.4   |  22  |  1.25    Ca    8.6      21 Dec 2021 01:08  Phos  3.2       Mg     2.1         TPro  5.8<L>  /  Alb  3.6  /  TBili  1.3<H>  /  DBili  x   /  AST  50<H>  /  ALT  60<H>  /  AlkPhos  117      LIVER FUNCTIONS - ( 21 Dec 2021 01:08 )  Alb: 3.6 g/dL / Pro: 5.8 g/dL / ALK PHOS: 117 U/L / ALT: 60 U/L / AST: 50 U/L / GGT: x           PT/INR - ( 20 Dec 2021 08:45 )   PT: 11.5 sec;   INR: 0.96 ratio         PTT - ( 20 Dec 2021 08:45 )  PTT:28.0 sec            TELEMETRY:     EKG:     IMAGING:  Staci Bourgeois, PGY1  CC[facility] Service  Internal Medicine  Pager: [phone] (NS)    PATIENT: HUMBERTO SMITH, MRN: [id number]    CHIEF COMPLAINT: Patient is a 66y old  Male who presents with a chief complaint of AV block (20 Dec 2021 22:52)      INTERVAL HISTORY/OVERNIGHT EVENTS: No overnight events. Denies N/V/D/C. Last BM x1 regular yesterday. Denies abdominal pain. Denies chest pain or SOB, cough. Has been ambulating with assistance. Oriented to person, place, and time. Breathing comfortably on room air.    REVIEW OF SYSTEMS:    Constitutional:     [ ] negative [ ] fevers [ ] chills [ ] weight loss [ ] weight gain  HEENT:                  [ ] negative [ ] dry eyes [ ] eye irritation [ ] postnasal drip [ ] nasal congestion  CV:                         [ ] negative  [ ] chest pain [ ] orthopnea [ ] palpitations [ ] murmur  Resp:                     [ ] negative [ ] cough [ ] shortness of breath [ ] dyspnea [ ] wheezing [ ] sputum [ ] hemoptysis  GI:                          [ ] negative [ ] nausea [ ] vomiting [ ] diarrhea [ ] constipation [ ] abd pain [ ] dysphagia   :                        [ ] negative [ ] dysuria [ ] nocturia [ ] hematuria [ ] increased urinary frequency  Musculoskeletal: [ ] negative [ ] back pain [ ] myalgias [ ] arthralgias [ ] fracture  Skin:                       [ ] negative [ ] rash [ ] itch  Neurological:        [ ] negative [ ] headache [ ] dizziness [ ] syncope [ ] weakness [ ] numbness  Psychiatric:           [ ] negative [ ] anxiety [ ] depression  Endocrine:            [ ] negative [ ] diabetes [ ] thyroid problem  Heme/Lymph:      [ ] negative [ ] anemia [ ] bleeding problem  Allergic/Immune: [ ] negative [ ] itchy eyes [ ] nasal discharge [ ] hives [ ] angioedema    [x] All other systems negative  [ ] Unable to assess ROS because ________.    MEDICATIONS:  MEDICATIONS  (STANDING):  chlorhexidine 4% Liquid 1 Application(s) Topical <User Schedule>    MEDICATIONS  (PRN):      ALLERGIES: Allergies    No Known Allergies    Intolerances        OBJECTIVE:  ICU Vital Signs Last 24 Hrs  T(C): 36.7 (21 Dec 2021 07:00), Max: 37.2 (21 Dec 2021 00:00)  T(F): 98 (21 Dec 2021 07:00), Max: 99 (21 Dec 2021 00:00)  HR: 32 (21 Dec 2021 07:00) (30 - 41)  BP: 111/57 (21 Dec 2021 07:00) (93/54 - 138/72)  BP(mean): 81 (21 Dec 2021 07:00) (71 - 92)  ABP: --  ABP(mean): --  RR: 26 (21 Dec 2021 07:00) (16 - 31)  SpO2: 99% (21 Dec 2021 07:00) (93% - 100%)      Adult Advanced Hemodynamics Last 24 Hrs  CVP(mm Hg): --  CVP(cm H2O): --  CO: --  CI: --  PA: --  PA(mean): --  PCWP: --  SVR: --  SVRI: --  PVR: --  PVRI: --  CAPILLARY BLOOD GLUCOSE        CAPILLARY BLOOD GLUCOSE      POCT Blood Glucose.: 113 mg/dL (20 Dec 2021 07:57)    I&O's Summary    20 Dec 2021 07:01  -  21 Dec 2021 07:00  --------------------------------------------------------  IN: 240 mL / OUT: 900 mL / NET: -660 mL      Daily Height in cm: 167.64 (20 Dec 2021 18:12)    Daily Weight in k (21 Dec 2021 06:00)    PHYSICAL EXAMINATION:  General: Comfortable, no acute distress, cooperative with exam.  HEENT: PERRLA, EOMI, moist mucous membranes.  Respiratory: CTAB, normal respiratory effort, no coughing, wheezes, crackles, or rales.  CV: RRR, S1S2, no murmurs, rubs or gallops. No JVD. Distal pulses intact.  Abdominal: Soft, nontender, nondistended, no rebound or guarding, normal bowel sounds.  Neurology: AOx3, no focal neuro defects, BRYAN x 4.  Extremities: No pitting edema, + Peripheral pulses.    LABS:                          12.9   6.22  )-----------( 127      ( 21 Dec 2021 01:09 )             38.8     12    142  |  109<H>  |  28<H>  ----------------------------<  106<H>  4.4   |  22  |  1.25    Ca    8.6      21 Dec 2021 01:08  Phos  3.2       Mg     2.1         TPro  5.8<L>  /  Alb  3.6  /  TBili  1.3<H>  /  DBili  x   /  AST  50<H>  /  ALT  60<H>  /  AlkPhos  117      LIVER FUNCTIONS - ( 21 Dec 2021 01:08 )  Alb: 3.6 g/dL / Pro: 5.8 g/dL / ALK PHOS: 117 U/L / ALT: 60 U/L / AST: 50 U/L / GGT: x           PT/INR - ( 20 Dec 2021 08:45 )   PT: 11.5 sec;   INR: 0.96 ratio         PTT - ( 20 Dec 2021 08:45 )  PTT:28.0 sec            TELEMETRY:     EKG:     IMAGING:

## 2021-12-21 NOTE — CHART NOTE - NSCHARTNOTEFT_GEN_A_CORE
CCU Transfer Note    Transfer from: CCU    Transfer to: (  ) Medicine    (x) Telemetry    (  ) RCU                               (  ) Palliative    (  ) Stroke Unit    (  ) MICU    (  ) __________________    Accepting Physician:    Signout given to:     HPI / CCU COURSE:  HPI:  66M history of TIA, PFO (dx Feb 2021), vertebral stenosis on Xarelto who presents with complaints of losing consciousness since sunday. pt states he has had 3 episodes of LOC, lasting ~1minute. Never had similar symptoms before. Also notes a few days of intermittent exertional chest pain/generalized weakness. No fever, chills, palpitations, SOB.  ED course: Vitals HR 41, /81, satting 99% on RA, RR 16.  patient found to be in 2:1 AVB with episodes of complete heart block. Asymptomatic at rest. Exam and labs unremarkable. EP consulted, patient transferred to CDU for hemodynamic monitoring   Initial trop 6 -> rpt 6. pro bnp 361.  (20 Dec 2021 17:45)    CCU course:  PPM placed 12/21 with plan for CTA coronaries post-implant per EP.         Vital Signs Last 24 Hrs  T(C): 36.7 (21 Dec 2021 10:52), Max: 37.2 (21 Dec 2021 00:00)  T(F): 98 (21 Dec 2021 09:46), Max: 99 (21 Dec 2021 00:00)  HR: 33 (21 Dec 2021 10:52) (30 - 41)  BP: 130/58 (21 Dec 2021 10:52) (93/54 - 138/62)  BP(mean): 84 (21 Dec 2021 10:52) (71 - 89)  RR: 20 (21 Dec 2021 10:52) (16 - 31)  SpO2: 99% (21 Dec 2021 10:52) (93% - 100%)    I&O's Summary    20 Dec 2021 07:01  -  21 Dec 2021 07:00  --------------------------------------------------------  IN: 240 mL / OUT: 900 mL / NET: -660 mL    21 Dec 2021 07:01  -  21 Dec 2021 12:00  --------------------------------------------------------  IN: 0 mL / OUT: 200 mL / NET: -200 mL        Physical Exam:       LABS:                               12.9   6.22  )-----------( 127      ( 21 Dec 2021 01:09 )             38.8       12-21    142  |  109<H>  |  28<H>  ----------------------------<  106<H>  4.4   |  22  |  1.25    Ca    8.6      21 Dec 2021 01:08  Phos  3.2     12-21  Mg     2.1     12-21    TPro  5.8<L>  /  Alb  3.6  /  TBili  1.3<H>  /  DBili  x   /  AST  50<H>  /  ALT  60<H>  /  AlkPhos  117  12-21      PT/INR - ( 20 Dec 2021 08:45 )   PT: 11.5 sec;   INR: 0.96 ratio         PTT - ( 20 Dec 2021 08:45 )  PTT:28.0 sec          ECG:    Telemetry:    Imaging:      ASSESSMENT & PLAN:   66M history of TIA, PFO (dx Feb 2021), vertebral stenosis on plavix who presents with multiple syncopal episodes found to be in 2:1 AVB, plan for PPM tomorrow. Admitted to CCU for hemodynamic monitoring     #Neuro  - aaox4  - no acute interventions    #Cardiovascular  2-1 AV block with intermittent complete block  -currently asymptomatic  - Trop 6, pro-  -Keep transcutaneous pacer pads on patient.   -No plan for TVP placement at this time.   -Hold home plavix    -Hold any AVN blocking agents (CCB, BB, digoxin, amiodarone, adenosine).  -NPO for PPM placement 12/21    #Respiratory  - CXR negative for pulmonary disease  - Satting 99% on RA    #GI/Nutrition  - NPO    #/Renal  - Monitor I&Os  - baseline Cr 1.24    #Heme  - H&H stable    #Endocrine  - Monitor glucose trends  - Check A1c, TSH    #Hematologic/DVT ppx  - SCDs    #Ethics  - Full code        FOR FOLLOW UP:  [ ] f/u CTA coronaries   [ ] f/u EP recs  [ ] CCU Transfer Note    Transfer from: CCU    Transfer to: (  ) Medicine    (x) Telemetry    (  ) RCU                               (  ) Palliative    (  ) Stroke Unit    (  ) MICU    (  ) __________________    Accepting Physician:    Signout given to:     HPI / CCU COURSE:  HPI:  66M history of TIA, PFO (dx Feb 2021), vertebral stenosis on Xarelto who presents with complaints of losing consciousness since sunday. pt states he has had 3 episodes of LOC, lasting ~1minute. Never had similar symptoms before. Also notes a few days of intermittent exertional chest pain/generalized weakness. No fever, chills, palpitations, SOB.  ED course: Vitals HR 41, /81, satting 99% on RA, RR 16.  patient found to be in 2:1 AVB with episodes of complete heart block. Asymptomatic at rest. Exam and labs unremarkable. EP consulted, patient transferred to CDU for hemodynamic monitoring   Initial trop 6 -> rpt 6. pro bnp 361.  (20 Dec 2021 17:45)    CCU course:  PPM placed 12/21 with plan for CTA coronaries post-implant per EP.         Vital Signs Last 24 Hrs  T(C): 36.7 (21 Dec 2021 10:52), Max: 37.2 (21 Dec 2021 00:00)  T(F): 98 (21 Dec 2021 09:46), Max: 99 (21 Dec 2021 00:00)  HR: 33 (21 Dec 2021 10:52) (30 - 41)  BP: 130/58 (21 Dec 2021 10:52) (93/54 - 138/62)  BP(mean): 84 (21 Dec 2021 10:52) (71 - 89)  RR: 20 (21 Dec 2021 10:52) (16 - 31)  SpO2: 99% (21 Dec 2021 10:52) (93% - 100%)    I&O's Summary    20 Dec 2021 07:01  -  21 Dec 2021 07:00  --------------------------------------------------------  IN: 240 mL / OUT: 900 mL / NET: -660 mL    21 Dec 2021 07:01  -  21 Dec 2021 12:00  --------------------------------------------------------  IN: 0 mL / OUT: 200 mL / NET: -200 mL        PHYSICAL EXAMINATION:  General: Comfortable, no acute distress, cooperative with exam.  HEENT: PERRLA, EOMI, moist mucous membranes.  Respiratory: CTAB, normal respiratory effort, no coughing, wheezes, crackles, or rales.  CV: RRR, S1S2, no murmurs, rubs or gallops. No JVD. Distal pulses intact.  Abdominal: Soft, nontender, nondistended, no rebound or guarding, normal bowel sounds.  Neurology: AOx3, no focal neuro defects, BRYAN x 4.  Extremities: No pitting edema, + Peripheral pulses      LABS:                               12.9   6.22  )-----------( 127      ( 21 Dec 2021 01:09 )             38.8       12-21    142  |  109<H>  |  28<H>  ----------------------------<  106<H>  4.4   |  22  |  1.25    Ca    8.6      21 Dec 2021 01:08  Phos  3.2     12-21  Mg     2.1     12-21    TPro  5.8<L>  /  Alb  3.6  /  TBili  1.3<H>  /  DBili  x   /  AST  50<H>  /  ALT  60<H>  /  AlkPhos  117  12-21      PT/INR - ( 20 Dec 2021 08:45 )   PT: 11.5 sec;   INR: 0.96 ratio         PTT - ( 20 Dec 2021 08:45 )  PTT:28.0 sec          ECG:    Telemetry:    Imaging:      ASSESSMENT & PLAN:   66M history of TIA, PFO (dx Feb 2021), vertebral stenosis on plavix who presents with multiple syncopal episodes found to be in 2:1 AVB, plan for PPM tomorrow. Admitted to CCU for hemodynamic monitoring     #Neuro  - aaox4  - no acute interventions    #Cardiovascular  2-1 AV block with intermittent complete block  -currently asymptomatic  - Trop 6, pro-  -Keep transcutaneous pacer pads on patient.   -No plan for TVP placement at this time.   -Hold home plavix    -Hold any AVN blocking agents (CCB, BB, digoxin, amiodarone, adenosine).  -NPO for PPM placement 12/21    #Respiratory  - CXR negative for pulmonary disease  - Satting 99% on RA    #GI/Nutrition  - NPO    #/Renal  - Monitor I&Os  - baseline Cr 1.24    #Heme  - H&H stable    #Endocrine  - Monitor glucose trends  - Check A1c, TSH    #Hematologic/DVT ppx  - SCDs    #Ethics  - Full code        FOR FOLLOW UP:  [ ] f/u CTA coronaries   [ ] f/u EP recs  [ ] CCU Transfer Note    Transfer from: CCU    Transfer to: (  ) Medicine    (x) Telemetry    (  ) RCU                               (  ) Palliative    (  ) Stroke Unit    (  ) MICU    (  ) __________________    Accepting Physician: Dr. Carter    Signout given to:     HPI / CCU COURSE:  HPI:  66M history of TIA, PFO (dx Feb 2021), vertebral stenosis on Xarelto who presents with complaints of losing consciousness since sunday. pt states he has had 3 episodes of LOC, lasting ~1minute. Never had similar symptoms before. Also notes a few days of intermittent exertional chest pain/generalized weakness. No fever, chills, palpitations, SOB.  ED course: Vitals HR 41, /81, satting 99% on RA, RR 16.  patient found to be in 2:1 AVB with episodes of complete heart block. Asymptomatic at rest. Exam and labs unremarkable. EP consulted, patient transferred to CDU for hemodynamic monitoring   Initial trop 6 -> rpt 6. pro bnp 361.  (20 Dec 2021 17:45)    CCU course:  PPM placed 12/21 with plan for CTA coronaries post-implant per EP.         Vital Signs Last 24 Hrs  T(C): 36.7 (21 Dec 2021 10:52), Max: 37.2 (21 Dec 2021 00:00)  T(F): 98 (21 Dec 2021 09:46), Max: 99 (21 Dec 2021 00:00)  HR: 33 (21 Dec 2021 10:52) (30 - 41)  BP: 130/58 (21 Dec 2021 10:52) (93/54 - 138/62)  BP(mean): 84 (21 Dec 2021 10:52) (71 - 89)  RR: 20 (21 Dec 2021 10:52) (16 - 31)  SpO2: 99% (21 Dec 2021 10:52) (93% - 100%)    I&O's Summary    20 Dec 2021 07:01  -  21 Dec 2021 07:00  --------------------------------------------------------  IN: 240 mL / OUT: 900 mL / NET: -660 mL    21 Dec 2021 07:01  -  21 Dec 2021 12:00  --------------------------------------------------------  IN: 0 mL / OUT: 200 mL / NET: -200 mL        PHYSICAL EXAMINATION:  General: Comfortable, no acute distress, cooperative with exam.  HEENT: PERRLA, EOMI, moist mucous membranes.  Respiratory: CTAB, normal respiratory effort, no coughing, wheezes, crackles, or rales.  CV: RRR, S1S2, no murmurs, rubs or gallops. No JVD. Distal pulses intact.  Abdominal: Soft, nontender, nondistended, no rebound or guarding, normal bowel sounds.  Neurology: AOx3, no focal neuro defects, BRYAN x 4.  Extremities: No pitting edema, + Peripheral pulses      LABS:                               12.9   6.22  )-----------( 127      ( 21 Dec 2021 01:09 )             38.8       12-21    142  |  109<H>  |  28<H>  ----------------------------<  106<H>  4.4   |  22  |  1.25    Ca    8.6      21 Dec 2021 01:08  Phos  3.2     12-21  Mg     2.1     12-21    TPro  5.8<L>  /  Alb  3.6  /  TBili  1.3<H>  /  DBili  x   /  AST  50<H>  /  ALT  60<H>  /  AlkPhos  117  12-21      PT/INR - ( 20 Dec 2021 08:45 )   PT: 11.5 sec;   INR: 0.96 ratio         PTT - ( 20 Dec 2021 08:45 )  PTT:28.0 sec          ECG:    Telemetry:    Imaging:      ASSESSMENT & PLAN:   66M history of TIA, PFO (dx Feb 2021), vertebral stenosis on plavix who presents with multiple syncopal episodes found to be in 2:1 AVB, plan for PPM tomorrow. Admitted to CCU for hemodynamic monitoring     #Neuro  - aaox4  - no acute interventions    #Cardiovascular  2-1 AV block with intermittent complete block  -currently asymptomatic  - Trop 6, pro-  -Keep transcutaneous pacer pads on patient.   -No plan for TVP placement at this time.   -Hold home plavix    -Hold any AVN blocking agents (CCB, BB, digoxin, amiodarone, adenosine).  -NPO for PPM placement 12/21    #Respiratory  - CXR negative for pulmonary disease  - Satting 99% on RA    #GI/Nutrition  - NPO    #/Renal  - Monitor I&Os  - baseline Cr 1.24    #Heme  - H&H stable    #Endocrine  - Monitor glucose trends  - Check A1c, TSH    #Hematologic/DVT ppx  - SCDs    #Ethics  - Full code        FOR FOLLOW UP:  [ ] f/u CTA coronaries   [ ] f/u EP recs  [ ]

## 2021-12-21 NOTE — PROGRESS NOTE ADULT - ASSESSMENT
68yo M w/ hx of HLD, CVA (2/2021) TIA (8/2021), PFO closure (~3 weeks ago) s/p MDT ILR implant (10/25/21 - [facility]) followed by Dr. Mas, who presents to ED due to worsening dizziness & near syncope. Pt states he has been feeling dizzy and having syncopal episodes (with LOC ~1-2 secs per patient) for the last week, worsening in intensity. He also notes worsening exertional chest pain and SOB within the last week. Found to be bradycardic ~30s in ED, tele with 2:1 AVB. EP consulted for PPM evaluation.     1. CHB  2. CVA - on ASA & Plavix  3. HLD    Pt has been in CHB since 12/20 PM. Currently with ventricular escape at ~30bpm, minimal lightheadedness. Denies any SOB/chest pain, speaking without difficulty.   - Will move up PPM implant today. CTA coronaries post implant.   - Pt has been NPO. T&S/COVID active. Consent in chart.   Keep on tele. Keep K>4, Mg>2  Discussed with EP attending and primary team.    70yo M w/ hx of HLD, CVA (2/2021) TIA (8/2021), PFO closure (~3 weeks ago) s/p MDT ILR implant (10/25/21 - [facility]) followed by Dr. Mas, who presents to ED due to worsening dizziness & near syncope. Pt states he has been feeling dizzy and having syncopal episodes (with LOC ~1-2 secs per patient) for the last week, worsening in intensity. He also notes worsening exertional chest pain and SOB within the last week. Found to be bradycardic ~30s in ED, tele with 2:1 AVB. EP consulted for PPM evaluation.     1. CHB  2. CVA - on ASA & Plavix  3. HLD    Pt has been in CHB since 12/20 PM. Currently with ventricular escape at ~30bpm, minimal lightheadedness. Denies any SOB/chest pain, speaking without difficulty.   - Will move up PPM implant today. CTA coronaries post implant.   - Pt has been NPO. T&S/COVID active. Consent in chart.   Keep on tele. Keep K>4, Mg>2  Discussed with EP attending and primary team.     Addendum:  Pt is s/p dual chamber PPM  EKG now  NO heparin/lovenox   Pt can resume home meds (pt is NOT on AC at home)  PA/lat CXR in AM  Wound check appt scheduled for 1/3/22 for 3:40PM   68yo M w/ hx of HLD, CVA (2/2021) TIA (8/2021), PFO closure (~3 weeks ago) s/p MDT ILR implant (10/25/21 - [facility]) followed by Dr. Mas, who presents to ED due to worsening dizziness & near syncope. Pt states he has been feeling dizzy and having syncopal episodes (with LOC ~1-2 secs per patient) for the last week, worsening in intensity. He also notes worsening exertional chest pain and SOB within the last week. Found to be bradycardic ~30s in ED, tele with 2:1 AVB. EP consulted for PPM evaluation.     1. CHB  2. CVA - on ASA & Plavix  3. HLD    Pt has been in CHB since 12/20 PM. Currently with ventricular escape at ~30bpm, minimal lightheadedness. Denies any SOB/chest pain, speaking without difficulty.   - Will move up PPM implant & ILR explant today. CTA coronaries post implant.   - Pt has been NPO. T&S/COVID active. Consent in chart.   Keep on tele. Keep K>4, Mg>2  Discussed with EP attending and primary team.     Addendum:  Pt is s/p dual chamber PPM & ILR explant  EKG now  NO heparin/lovenox   Pt can resume home meds (pt is NOT on AC at home)  PA/lat CXR in AM  Wound check appt scheduled for 1/3/22 for 3:40PM

## 2021-12-21 NOTE — PRE-ANESTHESIA EVALUATION ADULT - NSANTHOSAYNRD_GEN_A_CORE
No. GLEN screening performed.  STOP BANG Legend: 0-2 = LOW Risk; 3-4 = INTERMEDIATE Risk; 5-8 = HIGH Risk

## 2021-12-22 ENCOUNTER — TRANSCRIPTION ENCOUNTER (OUTPATIENT)
Age: 66
End: 2021-12-22

## 2021-12-22 VITALS
HEART RATE: 67 BPM | RESPIRATION RATE: 18 BRPM | OXYGEN SATURATION: 98 % | DIASTOLIC BLOOD PRESSURE: 78 MMHG | SYSTOLIC BLOOD PRESSURE: 120 MMHG | TEMPERATURE: 98 F

## 2021-12-22 DIAGNOSIS — I44.2 ATRIOVENTRICULAR BLOCK, COMPLETE: ICD-10-CM

## 2021-12-22 PROCEDURE — C1785: CPT

## 2021-12-22 PROCEDURE — 85025 COMPLETE CBC W/AUTO DIFF WBC: CPT

## 2021-12-22 PROCEDURE — U0005: CPT

## 2021-12-22 PROCEDURE — 36415 COLL VENOUS BLD VENIPUNCTURE: CPT

## 2021-12-22 PROCEDURE — 99024 POSTOP FOLLOW-UP VISIT: CPT

## 2021-12-22 PROCEDURE — 71046 X-RAY EXAM CHEST 2 VIEWS: CPT

## 2021-12-22 PROCEDURE — 93005 ELECTROCARDIOGRAM TRACING: CPT

## 2021-12-22 PROCEDURE — 71046 X-RAY EXAM CHEST 2 VIEWS: CPT | Mod: 26

## 2021-12-22 PROCEDURE — 93010 ELECTROCARDIOGRAM REPORT: CPT

## 2021-12-22 PROCEDURE — 83036 HEMOGLOBIN GLYCOSYLATED A1C: CPT

## 2021-12-22 PROCEDURE — 84484 ASSAY OF TROPONIN QUANT: CPT

## 2021-12-22 PROCEDURE — 71045 X-RAY EXAM CHEST 1 VIEW: CPT

## 2021-12-22 PROCEDURE — 33286 RMVL SUBQ CAR RHYTHM MNTR: CPT | Mod: 59

## 2021-12-22 PROCEDURE — 85610 PROTHROMBIN TIME: CPT

## 2021-12-22 PROCEDURE — 84100 ASSAY OF PHOSPHORUS: CPT

## 2021-12-22 PROCEDURE — 83880 ASSAY OF NATRIURETIC PEPTIDE: CPT

## 2021-12-22 PROCEDURE — 33208 INSRT HEART PM ATRIAL & VENT: CPT

## 2021-12-22 PROCEDURE — 86901 BLOOD TYPING SEROLOGIC RH(D): CPT

## 2021-12-22 PROCEDURE — 99291 CRITICAL CARE FIRST HOUR: CPT | Mod: 25

## 2021-12-22 PROCEDURE — 86900 BLOOD TYPING SEROLOGIC ABO: CPT

## 2021-12-22 PROCEDURE — 99239 HOSP IP/OBS DSCHRG MGMT >30: CPT | Mod: GC

## 2021-12-22 PROCEDURE — 80053 COMPREHEN METABOLIC PANEL: CPT

## 2021-12-22 PROCEDURE — 83735 ASSAY OF MAGNESIUM: CPT

## 2021-12-22 PROCEDURE — C1892: CPT

## 2021-12-22 PROCEDURE — U0003: CPT

## 2021-12-22 PROCEDURE — 86850 RBC ANTIBODY SCREEN: CPT

## 2021-12-22 PROCEDURE — 80061 LIPID PANEL: CPT

## 2021-12-22 PROCEDURE — 36000 PLACE NEEDLE IN VEIN: CPT

## 2021-12-22 PROCEDURE — 86803 HEPATITIS C AB TEST: CPT

## 2021-12-22 PROCEDURE — 84443 ASSAY THYROID STIM HORMONE: CPT

## 2021-12-22 PROCEDURE — 82962 GLUCOSE BLOOD TEST: CPT

## 2021-12-22 PROCEDURE — C1898: CPT

## 2021-12-22 PROCEDURE — 85730 THROMBOPLASTIN TIME PARTIAL: CPT

## 2021-12-22 PROCEDURE — 86769 SARS-COV-2 COVID-19 ANTIBODY: CPT

## 2021-12-22 RX ORDER — SIMVASTATIN 20 MG/1
1 TABLET, FILM COATED ORAL
Qty: 0 | Refills: 0 | DISCHARGE

## 2021-12-22 RX ORDER — ASPIRIN/CALCIUM CARB/MAGNESIUM 324 MG
0 TABLET ORAL
Qty: 0 | Refills: 0 | DISCHARGE

## 2021-12-22 RX ORDER — ACETAMINOPHEN 500 MG
650 TABLET ORAL ONCE
Refills: 0 | Status: COMPLETED | OUTPATIENT
Start: 2021-12-22 | End: 2021-12-22

## 2021-12-22 RX ORDER — CLOPIDOGREL BISULFATE 75 MG/1
1 TABLET, FILM COATED ORAL
Qty: 0 | Refills: 0 | DISCHARGE

## 2021-12-22 RX ADMIN — Medication 650 MILLIGRAM(S): at 04:29

## 2021-12-22 RX ADMIN — Medication 650 MILLIGRAM(S): at 05:46

## 2021-12-22 NOTE — PROGRESS NOTE ADULT - SUBJECTIVE AND OBJECTIVE BOX
24H hour events: Pt c/o soreness at PPM site, relieved with tylenol. No acute events overnight, Tele: SR/V pacing at 60-80    MEDICATIONS:      REVIEW OF SYSTEMS:  Complete 10point ROS negative.    PHYSICAL EXAM:  T(C): 36.6 (12-22-21 @ 12:17), Max: 36.9 (12-21-21 @ 21:32)  HR: 67 (12-22-21 @ 12:17) (61 - 73)  BP: 120/78 (12-22-21 @ 12:17) (114/55 - 152/70)  RR: 18 (12-22-21 @ 12:17) (13 - 20)  SpO2: 98% (12-22-21 @ 12:17) (92% - 99%)  Wt(kg): --  I&O's Summary    21 Dec 2021 07:01  -  22 Dec 2021 07:00  --------------------------------------------------------  IN: 0 mL / OUT: 1400 mL / NET: -1400 mL    22 Dec 2021 07:01  -  22 Dec 2021 13:44  --------------------------------------------------------  IN: 240 mL / OUT: 450 mL / NET: -210 mL      Appearance: Normal	  Cardiovascular: Normal S1 S2, No JVD, No murmurs  Respiratory: Lungs clear to auscultation	  Psychiatry: A & O x 3, Mood & affect appropriate  Gastrointestinal: Soft, Non-tender, + BS	  Skin: Left infraclavicular PPM site C/D/I. Loop explant site C/D/I without hematoma    Extremities: No clubbing, cyanosis or edema  Vascular: Peripheral pulses palpable 2+ bilaterally      LABS:	 	    CBC Full  -  ( 21 Dec 2021 01:09 )  WBC Count : 6.22 K/uL  Hemoglobin : 12.9 g/dL  Hematocrit : 38.8 %  Platelet Count - Automated : 127 K/uL  Mean Cell Volume : 85.8 fl  Mean Cell Hemoglobin : 28.5 pg  Mean Cell Hemoglobin Concentration : 33.2 gm/dL  Auto Neutrophil # : 3.45 K/uL  Auto Lymphocyte # : 2.04 K/uL  Auto Monocyte # : 0.51 K/uL  Auto Eosinophil # : 0.16 K/uL  Auto Basophil # : 0.05 K/uL  Auto Neutrophil % : 55.4 %  Auto Lymphocyte % : 32.8 %  Auto Monocyte % : 8.2 %  Auto Eosinophil % : 2.6 %  Auto Basophil % : 0.8 %    12-21    142  |  109<H>  |  28<H>  ----------------------------<  106<H>  4.4   |  22  |  1.25  12-20    142  |  108  |  21  ----------------------------<  72  4.4   |  21<L>  |  1.11    Ca    8.6      21 Dec 2021 01:08  Ca    9.1      20 Dec 2021 19:06  Phos  3.2     12-21  Phos  2.6     12-20  Mg     2.1     12-21  Mg     2.1     12-20    TPro  5.8<L>  /  Alb  3.6  /  TBili  1.3<H>  /  DBili  x   /  AST  50<H>  /  ALT  60<H>  /  AlkPhos  117  12-21  TPro  6.1  /  Alb  3.9  /  TBili  1.8<H>  /  DBili  x   /  AST  58<H>  /  ALT  65<H>  /  AlkPhos  132<H>  12-20      proBNP: Serum Pro-Brain Natriuretic Peptide: 361 pg/mL (12-20 @ 08:45)    Thyroid Stimulating Hormone, Serum (12.21.21 @ 01:39)    Thyroid Stimulating Hormone, Serum: 1.24 uIU/mL      TELEMETRY: SR/V pacing at 60-80   	    ECG: SR/V pace at 63 bpm     	  < from: TTE with Doppler (w/3D Echo) (Transthoracic Echocardiogram) (02.21.21 @ 09:10) >  Dimensions:    Normal Values:  LA:     3.5    2.0 - 4.0 cm  Ao:     3.7    2.0 - 3.8 cm  SEPTUM: 0.8   0.6 - 1.2 cm  PWT:    0.9    0.6 - 1.1 cm  LVIDd:  4.7    3.0 - 5.6 cm  LVIDs:  2.7    1.8 - 4.0 cm  Derived variables:  LVMI: 69 g/m2  RWT: 0.38  Fractional short: 43 %  EF (Visual Estimate): 70-75 %  Doppler Peak Velocity (m/sec): AoV=1.1  ------------------------------------------------------------------------  Observations:  Mitral Valve: Mitral annular calcification, otherwise  normal mitral valve. Minimal mitral regurgitation.  Aortic Valve/Aorta: Calcified trileaflet aortic valve with  normal opening. Peak transaortic valve gradient equals 5 mm  Hg. No aortic valve regurgitation seen.  Aortic Root: 3.7 cm.  Left Atrium: Normal left atrium.  LA volume index = 16  cc/m2.  Left Ventricle: Normal left ventricular systolic function.  No segmental wall motion abnormalities. Normal left  ventricular internal dimensions and wall thicknesses. Mild  diastolic dysfunction (Stage I).  Right Heart: Normal right atrium. Normal right ventricular  size and function. Normal tricuspid valve. Minimal  tricuspid regurgitation. Normal pulmonic valve. No pulmonic  regurgitation.  Pericardium/Pleura: Normal pericardium with no pericardial  effusion.  Hemodynamic: Estimated right ventricular systolic pressure  equals 9 mm Hg, assuming right atrial pressure equals 3 mm  Hg, consistent with normal pulmonary pressures. Agitated  saline injection demonstrates evidence of a patent foramen  ovale.------------------------------------------------------------------------  Conclusions:  1. Mitral annular calcification, otherwise normal mitral  valve. Minimal mitral regurgitation.  2. Calcified trileaflet aortic valve with normal opening.  No aortic valve regurgitation seen.  3. Normal left ventricular systolic function. No segmental  wall motion abnormalities.  4. Mild diastolic dysfunction (Stage I).  5. Normal right ventricular size and function.  6. Agitated saline injection demonstrates evidence of a  patent foramen ovale.    < end of copied text >    PA/LA CXR: Preliminary result without ptx, good leads placement.

## 2021-12-22 NOTE — PROGRESS NOTE ADULT - ATTENDING COMMENTS
seen and agree
Patient presents with symptomatic bradycardia.  Seen to have high degree AV block.  Plan for PPM today per EP.
seen and agree
PAtient seen and evaluated. s/p PPM, per EP placement confirmed with CXR. Unable to obtain CTA 2/2unable to lift arms.   After discussion with Ep, cleared for discharge with outaptient cards follow up for CT coronaries in 6 weeks.  d/c planning 32 minutes

## 2021-12-22 NOTE — DISCHARGE NOTE NURSING/CASE MANAGEMENT/SOCIAL WORK - NSDCPEFALRISK_GEN_ALL_CORE
For information on Fall & Injury Prevention, visit: [email or web address]  [email or web address] OR  https://www.cdc.gov/steadi/patient.html

## 2021-12-22 NOTE — DISCHARGE NOTE PROVIDER - NSFOLLOWUPCLINICS_GEN_ALL_ED_FT
[facility]  Cardiology  [address] [place] [address]  Phone: [phone]  Fax:   Scheduled Appointment: 1/3/2022 3:40 AM

## 2021-12-22 NOTE — DISCHARGE NOTE PROVIDER - HOSPITAL COURSE
HPI:  66M history of TIA, PFO (dx Feb 2021), vertebral stenosis on Xarelto who presents with complaints of losing consciousness since sunday. pt states he has had 3 episodes of LOC, lasting ~1minute. Never had similar symptoms before. Also notes a few days of intermittent exertional chest pain/generalized weakness. No fever, chills, palpitations, SOB.  ED course: Vitals HR 41, /81, satting 99% on RA, RR 16.  patient found to be in 2:1 AVB with episodes of complete heart block. Asymptomatic at rest. Exam and labs unremarkable. EP consulted, patient transferred to CDU for hemodynamic monitoring   Initial trop 6 -> rpt 6. pro bnp 361.  (20 Dec 2021 17:45)    CCU course:  PPM placed 12/21 with plan for CTA coronaries post-implant per EP. Labs done, TSH, Lipid Profile, A1c (5.6) WNL. Hep C neg.      Inpatient floor:  Patient doing well, with HRs 60-80 with no palpitation, dizziness or SOB. Pt with chest pain around incision of PPM. No drainage from site. Pt had Chest Xray which showed leads place appropriately. EP is following and cleared from their perspective. EP would like patient to follow up outpatient for wound check at EP device clinic. Also, instructed to have CTA coronaries to rule out CAD in 6 weeks once incision healed. He will follow up with outpatient cardiologist and ordered will be done by EP.          HPI:  66M history of TIA, PFO (dx Feb 2021), vertebral stenosis on Xarelto who presents with complaints of losing consciousness since sunday. Patient states he has had 3 episodes of LOC, lasting ~1minute. Never had similar symptoms before. Also notes a few days of intermittent exertional chest pain/generalized weakness. No fever, chills, palpitations, SOB.  ED course: Vitals HR 41, /81, satting 99% on RA, RR 16.  patient found to be in 2:1 AVB with episodes of complete heart block. Asymptomatic at rest. Exam and labs unremarkable. EP consulted, patient transferred to CDU for hemodynamic monitoring   Initial trop 6 -> rpt 6. pro bnp 361.  (20 Dec 2021 17:45)    CCU course:  PPM placed 12/21 with plan for CTA coronaries post-implant per EP as an outpatient. Labs done, TSH, Lipid Profile, A1c (5.6) WNL. Hep C neg.      Inpatient floor:  Patient doing well, with HRs 60-80 with no palpitation, dizziness or SOB. Pt with chest pain around incision of PPM. No drainage from site. Pt had Chest Xray which showed leads were placed appropriately. EP is following and noted the patient was safe for discharge from their perspective . EP would like patient to follow up outpatient for wound check at EP device clinic. Also, instructed the patient to have CTA coronaries to rule out CAD in 6 weeks once incision healed. He will follow up with his outpatient cardiologist and the CT will be ordered EP as an outpatient which was discussed with the EP team.

## 2021-12-22 NOTE — DISCHARGE NOTE PROVIDER - CARE PROVIDER_API CALL
DOUG AGUAYO  Internal Medicine  [address] [place] [address]  Phone: [phone]  Fax: [phone]  Follow Up Time: 1 month

## 2021-12-22 NOTE — DISCHARGE NOTE PROVIDER - CARE PROVIDERS DIRECT ADDRESSES
[email or web address]

## 2021-12-22 NOTE — DISCHARGE NOTE PROVIDER - NSDCFUADDAPPT_GEN_ALL_CORE_FT
1. Appointment for EP Device Clinic for wound care of pacemaker incision on 1/3/22 at 3:40 pm, call [phone]  2. Follow up with cardiologist in 6 weeks   3. Have CT angiogram to look if there is any coronary artery disease - cardiologist will contact you and provide prescription for imaging.

## 2021-12-22 NOTE — PROGRESS NOTE ADULT - SUBJECTIVE AND OBJECTIVE BOX
Patient is a 66y old  Male who presents with a chief complaint of AV block (22 Dec 2021 14:25)      INTERVAL HPI/OVERNIGHT EVENTS:      REVIEW OF SYSTEMS:  CONSTITUTIONAL: No fever, weight loss, or fatigue  EYES: No eye pain, visual disturbances, or discharge  ENMT:  No difficulty hearing, tinnitus, vertigo; No sinus or throat pain  NECK: No pain or stiffness  BREASTS: No pain, masses, or nipple discharge  RESPIRATORY: No cough, wheezing, chills or hemoptysis; No shortness of breath  CARDIOVASCULAR: No chest pain, palpitations, dizziness, or leg swelling  GASTROINTESTINAL: No abdominal or epigastric pain. No nausea, vomiting, or hematemesis; No diarrhea or constipation. No melena or hematochezia.  GENITOURINARY: No dysuria, frequency, hematuria, or incontinence  NEUROLOGICAL: No headaches, memory loss, loss of strength, numbness, or tremors  SKIN: No itching, burning, rashes, or lesions   LYMPH NODES: No enlarged glands  ENDOCRINE: No heat or cold intolerance; No hair loss  MUSCULOSKELETAL: No joint pain or swelling; No muscle, back, or extremity pain  PSYCHIATRIC: No depression, anxiety, mood swings, or difficulty sleeping  HEME/LYMPH: No easy bruising, or bleeding gums  ALLERY AND IMMUNOLOGIC: No hives or eczema  FAMILY HISTORY:    T(C): 36.6 (12-22-21 @ 12:17), Max: 36.9 (12-21-21 @ 21:32)  HR: 67 (12-22-21 @ 12:17) (61 - 72)  BP: 120/78 (12-22-21 @ 12:17) (114/55 - 146/72)  RR: 18 (12-22-21 @ 12:17) (13 - 20)  SpO2: 98% (12-22-21 @ 12:17) (92% - 98%)  Wt(kg): --Vital Signs Last 24 Hrs  T(C): 36.6 (22 Dec 2021 12:17), Max: 36.9 (21 Dec 2021 21:32)  T(F): 97.8 (22 Dec 2021 12:17), Max: 98.4 (21 Dec 2021 21:32)  HR: 67 (22 Dec 2021 12:17) (61 - 72)  BP: 120/78 (22 Dec 2021 12:17) (114/55 - 146/72)  BP(mean): 91 (21 Dec 2021 21:00) (77 - 97)  RR: 18 (22 Dec 2021 12:17) (13 - 20)  SpO2: 98% (22 Dec 2021 12:17) (92% - 98%)    PHYSICAL EXAM:  GENERAL: NAD, well-groomed, well-developed  HEAD:  Atraumatic, Normocephalic  EYES: EOMI, PERRLA, conjunctiva and sclera clear  ENMT: No tonsillar erythema, exudates, or enlargement; Moist mucous membranes, Good dentition, No lesions  NECK: Supple, No JVD, Normal thyroid  NERVOUS SYSTEM:  Alert & Oriented X3, Good concentration; Motor Strength 5/5 B/L upper and lower extremities; DTRs 2+ intact and symmetric  CHEST/LUNG: Clear to percussion bilaterally; No rales, rhonchi, wheezing, or rubs  HEART: Regular rate and rhythm; No murmurs, rubs, or gallops  ABDOMEN: Soft, Nontender, Nondistended; Bowel sounds present  EXTREMITIES:  2+ Peripheral Pulses, No clubbing, cyanosis, or edema  LYMPH: No lymphadenopathy noted  SKIN: No rashes or lesions    Consultant(s) Notes Reviewed:  [x ] YES  [ ] NO  Care Discussed with Consultants/Other Providers [ x] YES  [ ] NO    LABS:          RADIOLOGY & ADDITIONAL TESTS:    Imaging Personally Reviewed:  [ ] YES  [ ] NO      HEALTH ISSUES - PROBLEM Dx:         PROGRESS NOTE:   Authored by Sarah Solis MD   Patient is a 66y old  Male who presents with a chief complaint of AV block (22 Dec 2021 14:25)      SUBJECTIVE / OVERNIGHT EVENTS:  No overnight night events. This morning patient states he is doing well. Has chest pain along incision for PPM. No dizziness, syncope or SOB. Pt able to ambulate around the room and eating well.     ADDITIONAL REVIEW OF SYSTEMS:  CONSTITUTIONAL: No fever, weight loss, or fatigue  EYES: No eye pain, visual disturbances, or discharge  ENMT:  No difficulty hearing, tinnitus, vertigo; No sinus or throat pain  NECK: No pain or stiffness  BREASTS: No pain, masses, or nipple discharge  RESPIRATORY: No cough, wheezing, chills or hemoptysis; No shortness of breath  CARDIOVASCULAR: No chest pain, palpitations, dizziness, or leg swelling  GASTROINTESTINAL: No abdominal or epigastric pain. No nausea, vomiting, or hematemesis; No diarrhea or constipation. No melena or hematochezia.  GENITOURINARY: No dysuria, frequency, hematuria, or incontinence  NEUROLOGICAL: No headaches, memory loss, loss of strength, numbness, or tremors  SKIN: No itching, burning, rashes, or lesions   LYMPH NODES: No enlarged glands  ENDOCRINE: No heat or cold intolerance; No hair loss  MUSCULOSKELETAL: No joint pain or swelling; No muscle, back, or extremity pain  PSYCHIATRIC: No depression, anxiety, mood swings, or difficulty sleeping  HEME/LYMPH: No easy bruising, or bleeding gums  ALLERY AND IMMUNOLOGIC: No hives or eczema    MEDICATIONS  (STANDING):      MEDICATIONS  (PRN):      I&O's Summary    21 Dec 2021 07:01  -  22 Dec 2021 07:00  --------------------------------------------------------  IN: 0 mL / OUT: 1400 mL / NET: -1400 mL    22 Dec 2021 07:01  -  22 Dec 2021 17:01  --------------------------------------------------------  IN: 520 mL / OUT: 850 mL / NET: -330 mL        PHYSICAL EXAM:  Vital Signs Last 24 Hrs  T(C): 36.6 (22 Dec 2021 12:17), Max: 36.9 (21 Dec 2021 21:32)  T(F): 97.8 (22 Dec 2021 12:17), Max: 98.4 (21 Dec 2021 21:32)  HR: 67 (22 Dec 2021 12:17) (61 - 72)  BP: 120/78 (22 Dec 2021 12:17) (114/55 - 146/72)  BP(mean): 91 (21 Dec 2021 21:00) (77 - 91)  RR: 18 (22 Dec 2021 12:17) (13 - 20)  SpO2: 98% (22 Dec 2021 12:17) (92% - 98%)    GENERAL: No acute distress, well-developed  HEAD:  Atraumatic, Normocephalic  EYES: EOMI, conjunctiva and sclera clear  CHEST/LUNG: CTAB; No wheezes, rales, or rhonchi. Site of incision covered with gauze. No erythema or drainage around site.   HEART: Regular rate and rhythm; No murmurs, rubs, or gallops  ABDOMEN: Soft, non-tender, non-distended; normal bowel sounds, no organomegaly  EXTREMITIES:  2+ peripheral pulses b/l, No clubbing, cyanosis, or edema  NEUROLOGY: A&O x 3, no focal deficits  SKIN: No rashes or lesions    LABS:                        12.9   6.22  )-----------( 127      ( 21 Dec 2021 01:09 )             38.8     12-21    142  |  109<H>  |  28<H>  ----------------------------<  106<H>  4.4   |  22  |  1.25    Ca    8.6      21 Dec 2021 01:08  Phos  3.2     12-21  Mg     2.1     12-21    TPro  5.8<L>  /  Alb  3.6  /  TBili  1.3<H>  /  DBili  x   /  AST  50<H>  /  ALT  60<H>  /  AlkPhos  117  12-21                RADIOLOGY & ADDITIONAL TESTS:  Results Reviewed:   Imaging Personally Reviewed:  Electrocardiogram Personally Reviewed:    COORDINATION OF CARE:  Care Discussed with Consultants/Other Providers [Y/N]:  Prior or Outpatient Records Reviewed [Y/N]:

## 2021-12-22 NOTE — DISCHARGE NOTE NURSING/CASE MANAGEMENT/SOCIAL WORK - PATIENT PORTAL LINK FT
You can access the FollowMyHealth Patient Portal offered by [facility] by registering at the following website: [email or web address]. By joining [employer]’s FollowMyHealth portal, you will also be able to view your health information using other applications (apps) compatible with our system.

## 2021-12-22 NOTE — PROGRESS NOTE ADULT - PROBLEM SELECTOR PLAN 1
- s/p PPM placement 12/21  - Chest X-ray read by EP and shown to have correct lead placement   - EP seen patient and cleared for discharge   - pt to have CTA outpatient in 6 weeks after d/c to check for CAD  - Follow up appt for wound care at EP device clinic arranged

## 2021-12-22 NOTE — DISCHARGE NOTE PROVIDER - NSDCMRMEDTOKEN_GEN_ALL_CORE_FT
Plavix 75 mg oral tablet: 1 tab(s) orally once a day    aspirin 81 mg oral tablet:   Plavix 75 mg oral tablet: 1 tab(s) orally once a day   simvastatin 40 mg oral tablet: 1 tab(s) orally once a day (at bedtime)

## 2021-12-22 NOTE — PROGRESS NOTE ADULT - ASSESSMENT
66 M with hx of TIA, PFO (dx Feb 2021) and vertebral stenosis presented to ED with complete heart block initially transferred to CICU for hemodynamic instability now transfer to general floor s/p PPM placement 12/21 and Loop recorder removal currently with stable vitals.

## 2021-12-22 NOTE — PROGRESS NOTE ADULT - ASSESSMENT
65yo M w/ hx of HLD, CVA (2/2021), TIA (8/2021), PFO closure (~3 weeks ago) s/p MDT ILR implant (10/25/21 - [facility] followed by Dr. Mas, who presents to ED due to worsening dizziness & near syncope. Pt states he has been feeling dizzy and having syncopal episodes (with LOC ~1-2 secs per patient) for the last week, worsening in intensity. He also notes worsening exertional chest pain and SOB within the last week. Found to be in complete heart block with ventricular rate in the 30's. Now s/p dual chamber pacemaker ([employer]tronic) implant and ILR explant on 12/21/21.     1. CHB  -Post PPM instructions enforced with pt  -Post PPM interrogated by MDT rep this am; normal device function, DDD  bpm   -PA/LA CXR revealed good leads placement  -Follow up PPM wound check in EP device clinic as scheduled on 1/3/22 at 3:40pm ([phone]).    2. Exertional chest pain  -Unable to obtain CTA coronaries to r/o CAD at this time given would require lifting his left arm above shoulder level for the test, instructed patient to follow up with his outpatient cardiologist to obtain CTA coronaries 6 weeks post pacemaker implant.     3. Dispo  -Cleared from EP perspective for discharge planning. Plan discussed with primary team.     FLOYD Blanchard NP-C  [phone]

## 2021-12-22 NOTE — DISCHARGE NOTE NURSING/CASE MANAGEMENT/SOCIAL WORK - NSDCFUADDAPPT_GEN_ALL_CORE_FT
1. Appointment for EP Device Clinic for wound care of pacemaker incision on 1/3/22 at 3:40 pm, call [phone]  2. Follow up with cardiologist in 6 weeks   3. Have CT angiogram to look if there is any coronary artery disease - cardiologist will contact you and provide prescription for imaging.

## 2021-12-22 NOTE — DISCHARGE NOTE PROVIDER - NSDCCPCAREPLAN_GEN_ALL_CORE_FT
PRINCIPAL DISCHARGE DIAGNOSIS  Diagnosis: Complete heart block  Assessment and Plan of Treatment: Heart block is a problem with the flow of electrical signals in your heart. The electrical signals control the way your heart beats. With heart block, these signals are delayed or interrupted completely. This affects the way your heart beats.  Call your local emergency number (911 in the ) if:   You have any of the following signs of a heart attack:   •Squeezing, pressure, or pain in your chest  •You may also have any of the following:  ?Discomfort or pain in your back, neck, jaw, stomach, or arm  ?Shortness of breath  ?Nausea or vomiting  ?Lightheadedness or a sudden cold sweat  Call your doctor or cardiologist if:   •Your symptoms are worse or happen more often.  •You have questions or concerns about your condition or care.         PRINCIPAL DISCHARGE DIAGNOSIS  Diagnosis: Complete heart block  Assessment and Plan of Treatment: Heart block is a problem with the flow of electrical signals in your heart. The electrical signals control the way your heart beats. With heart block, these signals are delayed or interrupted completely. This affects the way your heart beats. You came to the hospital with heart block and underwent a pacemaker placement to treat this abnormal rhythm. You were admitted to the cardiac intensive care unit and were transferred to the general medicine floor. You underwent an x-ray which showed appropriate positioning of your new pacemaker. Cardiac monitoring confirmed it was functioning appropriately. You will have to follow up with the Electrophysiology Cardiology team within 2 weeks of discharge as they placed the device and will have to reassess the incision. You will have to have a CT scan which the Electrophysiology Cardiology team will order for you as an outpatient.   Contact a health care provider immediately if you have any of the following:    You have any of the following signs of a heart attack:   • Squeezing, pressure, or pain in your chest  • Discomfort or pain in your back, neck, jaw, stomach, or arm  • Shortness of breath  • Nausea or vomiting  • Lightheadedness or a sudden cold sweat  Call your doctor or cardiologist if:   •Your symptoms are worse or happen more often.  •You have questions or concerns about your condition or care.

## 2021-12-30 NOTE — ED ADULT NURSE NOTE - PAIN RATING/NUMBER SCALE (0-10): ACTIVITY
Pediatric Cardiology Re-Evaluation    Referring Physician:  Christian Gallego MD    History of Present Illness:   Eunice Vivar is a 8 year old 4 month old female with an intermediate atrioventricular septal defect status post repair, here for follow up.  She is accompanied by her mother.  She was last evaluated on 12/30/2021.    Eunice was prenatally diagnosed with an intermediate atrioventricular septal defect and underwent repair on 11/25/13.     By report of her mother, Eunice has been doing well since her evaluation one year ago.  She does have ongoing issues with reflux intermittently.  She and her family all had Covid this Fall. There is no apparent history of neither chest pain nor palpitations nor shortness of breath nor tachypnea nor cyanosis nor exercise intolerance nor syncope nor poor growth.    Review of Systems   Constitutional: Negative for activity change, appetite change, diaphoresis, fatigue, fever, irritability and unexpected weight change.   HENT: Negative for congestion, dental problem, rhinorrhea, sneezing, sore throat, trouble swallowing and voice change.    Eyes: Negative for photophobia, discharge, redness and visual disturbance.   Respiratory: Negative for apnea, cough, choking, shortness of breath, wheezing and stridor.    Cardiovascular: Negative for chest pain, palpitations and leg swelling.   Gastrointestinal: Negative for abdominal distention, abdominal pain, blood in stool, constipation, diarrhea, nausea and vomiting.   Endocrine: Negative for cold intolerance, heat intolerance, polydipsia, polyphagia and polyuria.   Genitourinary: Negative for decreased urine volume, difficulty urinating, dysuria and urgency.   Musculoskeletal: Negative for arthralgias, back pain, gait problem, joint swelling, myalgias, neck pain and neck stiffness.   Skin: Negative for color change, pallor and rash.   Allergic/Immunologic: Negative for immunocompromised state.   Neurological: Negative for tremors,  seizures, facial asymmetry, speech difficulty, weakness, numbness and headaches.   Hematological: Negative for adenopathy. Does not bruise/bleed easily.   Psychiatric/Behavioral: Negative for agitation, behavioral problems, confusion, decreased concentration, dysphoric mood, hallucinations, self-injury, sleep disturbance and suicidal ideas. The patient is not nervous/anxious and is not hyperactive.        Patient Active Problem List   Diagnosis   • Intermediate common atrioventricular septal defect   • S/P atrioventricular septal defect repair   • Nonrheumatic aortic valve insufficiency       Past Medical History:   Diagnosis Date   • GERD (gastroesophageal reflux disease)    • Laryngomalacia    • Paroxysmal supraventricular tachycardia (CMS/HCC)     nonsustained run in NICU   • Premature infant     35 weeks GA, IUGR/SGA   • Pulmonary arterial hypertension (CMS/HCC)    • Vocal cord paralysis        Past Surgical History:   Procedure Laterality Date   • Atrioventricular canal repair  2013    s/p single patch repair of AVSD with closure of mitral valve cleft, PDA ligation, and PFO primary closure, ACH-OL   • Gastrostomy tube placement  02/2018    with Nissen fundoplication       Family History   Problem Relation Age of Onset   • Patient is unaware of any medical problems Mother    • Patient is unaware of any medical problems Father       There is no known family history of congenital heart disease, cardiomyopathy, early MI, arrhythmia, LQTS, sudden death.    Social History     Social History Narrative    Lives at home with parents.  Attends third grade.       No current outpatient medications on file.     No current facility-administered medications for this visit.       ALLERGIES:  No Known Allergies    OBJECTIVE  Visit Vitals  /64 (BP Location: RUE - Right upper extremity, Patient Position: Sitting, Cuff Size: Small Adult)   Pulse 107   Ht 3' 9.28\" (1.15 m)   Wt 21.7 kg (47 lb 13.4 oz)   SpO2 99%   BMI  16.41 kg/m²     9 %ile (Z= -1.33) based on CDC (Girls, 2-20 Years) weight-for-age data using vitals from 12/30/2021.  <1 %ile (Z= -2.59) based on CDC (Girls, 2-20 Years) Stature-for-age data based on Stature recorded on 12/30/2021.  59 %ile (Z= 0.22) based on [facility] (Girls, 2-20 Years) BMI-for-age based on BMI available as of 12/30/2021.  Blood pressure percentiles are 99 % systolic and 85 % diastolic based on the 2017 AAP Clinical Practice Guideline. This reading is in the Stage 1 hypertension range (BP >= 95th percentile).    Physical Exam  Constitutional:       General: She is active. She is not in acute distress.     Appearance: She is well-developed. She is not diaphoretic.   HENT:      Head: Normocephalic and atraumatic. No facial anomaly.      Right Ear: External ear normal.      Left Ear: External ear normal.      Nose: Nose normal.      Mouth/Throat:      Mouth: Mucous membranes are moist.      Pharynx: Oropharynx is clear.   Eyes:      General:         Right eye: No discharge.         Left eye: No discharge.      Conjunctiva/sclera: Conjunctivae normal.      Pupils: Pupils are equal, round, and reactive to light.   Cardiovascular:      Rate and Rhythm: Normal rate and regular rhythm.      Pulses:           Radial pulses are 2+ on the right side and 2+ on the left side.        Femoral pulses are 2+ on the right side and 2+ on the left side.       Dorsalis pedis pulses are 2+ on the right side and 2+ on the left side.        Posterior tibial pulses are 2+ on the right side and 2+ on the left side.      Heart sounds: S1 normal and S2 normal. Murmur (1-2/6 soft systolic regurgitant murmur at LSB) heard.   No friction rub. No gallop.    Pulmonary:      Effort: Pulmonary effort is normal. No respiratory distress or retractions.      Breath sounds: Normal breath sounds and air entry.   Chest:      Chest wall: No deformity or tenderness.   Abdominal:      General: Bowel sounds are normal. There is no distension.       Palpations: Abdomen is soft.      Tenderness: There is no abdominal tenderness. There is no guarding.   Musculoskeletal:         General: No deformity. Normal range of motion.      Cervical back: Normal range of motion and neck supple.   Skin:     General: Skin is warm and moist.      Capillary Refill: Capillary refill takes less than 2 seconds.      Coloration: Skin is not jaundiced or pale.   Neurological:      Mental Status: She is alert.      Cranial Nerves: No cranial nerve deficit.      Sensory: No sensory deficit.      Motor: No abnormal muscle tone.      Deep Tendon Reflexes: Reflexes normal.         Relevant Testing:  ECG, tracing personally reviewed: Normal sinus rhythm at 106 bpm, left axis deviation, right bundle branch block.    Echocardiogram, images personally reviewed:   Repaired atrioventricular septal defect with no apparent residual septal defects.  Mild right atrioventricular valve regurgitation, no right AV valve stenosis.  Mild to moderate left atrioventricular valve regurgitation comprised of two jets, no stenosis.  Attachments from the left AV valve to the ventricular septum with no left ventricular outflow tract obstruction.  Trileaflet aortic valve with mild central aortic insufficiency.  No aortic valve stenosis.  Mild left atrial enlargement.  Normal biventricular size and systolic function, with no ventricular hypertrophy.    No pericardial or obvious pleural effusion.        Problem List Items Addressed This Visit        Cardiac and Vasculature    Intermediate common atrioventricular septal defect - Primary    Relevant Orders    ELECTROCARDIOGRAM 12-LEAD    CONGENITAL TRANSTHORACIC ECHO (TTE) COMPLETE (PEDS) (Completed)    S/P atrioventricular septal defect repair    Relevant Orders    ELECTROCARDIOGRAM 12-LEAD    CONGENITAL TRANSTHORACIC ECHO (TTE) COMPLETE (PEDS) (Completed)    Nonrheumatic aortic valve insufficiency    Relevant Orders    ELECTROCARDIOGRAM 12-LEAD    CONGENITAL  TRANSTHORACIC ECHO (TTE) COMPLETE (PEDS) (Completed)          ASSESSMENT:  It is my impression that Eunice Perry has a repaired intermediate atrioventricular septal defect, with stable mild right atrioventricular valve regurgitation and mild to moderate left atrioventricular valve regurgitation.  She also has mild aortic insufficiency, stable.  I reviewed the findings of today's echocardiogram with the patient's mother in detail.  I advised the patient's mother that we will need to continue to monitor the function of her atrioventricular valves and her aortic valve over time.  Presently, she is asymptomatic and doing well from a cardiac standpoint.    Mother questioned the safety of the Covid vaccine for patients with congenital heart disease.  I explained that we are currently encouraging all patients with congenital heart disease to become vaccinated once eligible.  The risks and benefits of Covid infection versus vaccination favor vaccination.    RECOMMENDATIONS:  1. Medications:  No new medications.     2. Diagnostic tests:  No further cardiac laboratory tests or imaging required today.  3. SBE prophylaxis:  Not required.  4. Immunizations:  Routine immunizations should be provided, including influenza.  Does not qualify for RSV prophylaxis.  No apparent cardiac contraindication to Covid vaccination.  5. Exercise restrictions:   Based on the available history and data, the patient appears at no greater risk than the general population for adverse cardiac events with exertion.  6. Surgical/Anesthesia Concerns:  Based on the available history and data, the patient is at no greater cardiac risk than the general population for surgery, anesthesia, or sedation.  Non-cardiac risk factors should be assessed by the PCP and relevant subspecialists.  7. Patient education:  To contact us for any new, concerning, or recurrent symptoms.  8. Follow up:  A return visit to cardiology clinic is recommended in 1 year with ECG and  echocardiogram, unless new or concerning symptoms develop.  Routine follow up with the primary doctor is recommended.    The patient's mother expressed understanding and agreement with the above recommendations.  All questions were answered.    I spent 30 minutes in the care of this patient today, including review of relevant records and imaging, face-to-tace time, and documentation.      Rachel Pantoja MD  Pediatric Cardiology  [facility]     0

## 2022-01-03 ENCOUNTER — NON-APPOINTMENT (OUTPATIENT)
Age: 67
End: 2022-01-03

## 2022-01-03 ENCOUNTER — APPOINTMENT (OUTPATIENT)
Dept: ELECTROPHYSIOLOGY | Facility: CLINIC | Age: 67
End: 2022-01-03
Payer: COMMERCIAL

## 2022-01-03 VITALS
OXYGEN SATURATION: 100 % | BODY MASS INDEX: 27 KG/M2 | HEIGHT: 66 IN | SYSTOLIC BLOOD PRESSURE: 119 MMHG | DIASTOLIC BLOOD PRESSURE: 74 MMHG | WEIGHT: 168 LBS | HEART RATE: 77 BPM

## 2022-01-03 DIAGNOSIS — I44.2 ATRIOVENTRICULAR BLOCK, COMPLETE: ICD-10-CM

## 2022-01-03 PROCEDURE — 93280 PM DEVICE PROGR EVAL DUAL: CPT

## 2022-01-03 PROCEDURE — 99024 POSTOP FOLLOW-UP VISIT: CPT

## 2022-01-03 PROCEDURE — 93000 ELECTROCARDIOGRAM COMPLETE: CPT | Mod: 59

## 2022-01-17 ENCOUNTER — TRANSCRIPTION ENCOUNTER (OUTPATIENT)
Age: 67
End: 2022-01-17

## 2022-04-04 ENCOUNTER — NON-APPOINTMENT (OUTPATIENT)
Age: 67
End: 2022-04-04

## 2022-04-04 ENCOUNTER — APPOINTMENT (OUTPATIENT)
Dept: ELECTROPHYSIOLOGY | Facility: CLINIC | Age: 67
End: 2022-04-04
Payer: COMMERCIAL

## 2022-04-04 VITALS
DIASTOLIC BLOOD PRESSURE: 77 MMHG | BODY MASS INDEX: 27.32 KG/M2 | HEIGHT: 66 IN | WEIGHT: 170 LBS | OXYGEN SATURATION: 99 % | HEART RATE: 60 BPM | SYSTOLIC BLOOD PRESSURE: 125 MMHG

## 2022-04-04 PROCEDURE — 93280 PM DEVICE PROGR EVAL DUAL: CPT

## 2022-04-04 PROCEDURE — 93000 ELECTROCARDIOGRAM COMPLETE: CPT | Mod: 59

## 2022-07-05 ENCOUNTER — NON-APPOINTMENT (OUTPATIENT)
Age: 67
End: 2022-07-05

## 2022-07-05 ENCOUNTER — APPOINTMENT (OUTPATIENT)
Dept: ELECTROPHYSIOLOGY | Facility: CLINIC | Age: 67
End: 2022-07-05

## 2022-07-05 PROCEDURE — 93294 REM INTERROG EVL PM/LDLS PM: CPT

## 2022-07-05 PROCEDURE — 93296 REM INTERROG EVL PM/IDS: CPT

## 2022-10-04 ENCOUNTER — NON-APPOINTMENT (OUTPATIENT)
Age: 67
End: 2022-10-04

## 2022-10-04 ENCOUNTER — APPOINTMENT (OUTPATIENT)
Dept: ELECTROPHYSIOLOGY | Facility: CLINIC | Age: 67
End: 2022-10-04

## 2022-10-04 PROCEDURE — 93294 REM INTERROG EVL PM/LDLS PM: CPT

## 2022-10-04 PROCEDURE — 93296 REM INTERROG EVL PM/IDS: CPT

## 2022-11-23 ENCOUNTER — TRANSCRIPTION ENCOUNTER (OUTPATIENT)
Age: 67
End: 2022-11-23

## 2023-01-03 ENCOUNTER — NON-APPOINTMENT (OUTPATIENT)
Age: 68
End: 2023-01-03

## 2023-01-03 ENCOUNTER — APPOINTMENT (OUTPATIENT)
Dept: ELECTROPHYSIOLOGY | Facility: CLINIC | Age: 68
End: 2023-01-03
Payer: COMMERCIAL

## 2023-01-03 PROCEDURE — 93294 REM INTERROG EVL PM/LDLS PM: CPT

## 2023-01-03 PROCEDURE — 93296 REM INTERROG EVL PM/IDS: CPT

## 2023-01-24 NOTE — STROKE CODE NOTE - TIME PATIENT LAST KNOWN WELL
Known Protopic Counseling: Patient may experience a mild burning sensation during topical application. Protopic is not approved in children less than 2 years of age. There have been case reports of hematologic and skin malignancies in patients using topical calcineurin inhibitors although causality is questionable. 14:00

## 2023-04-04 ENCOUNTER — APPOINTMENT (OUTPATIENT)
Dept: ELECTROPHYSIOLOGY | Facility: CLINIC | Age: 68
End: 2023-04-04
Payer: COMMERCIAL

## 2023-04-04 ENCOUNTER — NON-APPOINTMENT (OUTPATIENT)
Age: 68
End: 2023-04-04

## 2023-04-04 VITALS — SYSTOLIC BLOOD PRESSURE: 114 MMHG | DIASTOLIC BLOOD PRESSURE: 69 MMHG | OXYGEN SATURATION: 98 % | HEART RATE: 58 BPM

## 2023-04-04 PROCEDURE — 93000 ELECTROCARDIOGRAM COMPLETE: CPT | Mod: 59

## 2023-04-04 PROCEDURE — 93280 PM DEVICE PROGR EVAL DUAL: CPT

## 2023-04-04 RX ORDER — FAMOTIDINE 10 MG/1
10 TABLET, FILM COATED ORAL
Qty: 30 | Refills: 0 | Status: ACTIVE | COMMUNITY
Start: 2023-04-04

## 2023-04-04 RX ORDER — TICAGRELOR 90 MG/1
90 TABLET ORAL
Refills: 0 | Status: DISCONTINUED | COMMUNITY
End: 2023-04-04

## 2023-04-26 NOTE — PATIENT PROFILE ADULT - FUNCTIONAL ASSESSMENT - BASIC MOBILITY ASSESSMENT TYPE
Per dr monk informed pt he may hold Xarelto for 48 hrs prior to teeth extractions & should resume it asap.  Pt verbalized understanding & will inform his oral surgeon.   Admission

## 2023-06-06 NOTE — ED ADULT NURSE NOTE - EXTENSIONS OF SELF_ADULT
None
PTA      Certification Period: 6/6/23 -9/6/23      Swati Chen, PT       6/6/2023       12:27 PM        ===================================================================  I certify that the above Therapy Services are being furnished while the patient is under my care. I agree with the treatment plan and certify that this therapy is necessary. [de-identified] Signature:_________________________   DATE:_________   TIME:________                           Jeremy Rubio MD    ** Signature, Date and Time must be completed for valid certification **  Please sign and fax to [phone].   Thank you

## 2023-07-05 ENCOUNTER — APPOINTMENT (OUTPATIENT)
Dept: ELECTROPHYSIOLOGY | Facility: CLINIC | Age: 68
End: 2023-07-05
Payer: COMMERCIAL

## 2023-07-05 ENCOUNTER — NON-APPOINTMENT (OUTPATIENT)
Age: 68
End: 2023-07-05

## 2023-07-05 PROCEDURE — 93296 REM INTERROG EVL PM/IDS: CPT

## 2023-07-05 PROCEDURE — 93294 REM INTERROG EVL PM/LDLS PM: CPT

## 2023-09-11 ENCOUNTER — APPOINTMENT (OUTPATIENT)
Dept: INTERNAL MEDICINE | Facility: CLINIC | Age: 68
End: 2023-09-11

## 2023-09-19 ENCOUNTER — APPOINTMENT (OUTPATIENT)
Dept: INTERNAL MEDICINE | Facility: CLINIC | Age: 68
End: 2023-09-19
Payer: COMMERCIAL

## 2023-09-19 ENCOUNTER — NON-APPOINTMENT (OUTPATIENT)
Age: 68
End: 2023-09-19

## 2023-09-19 VITALS
BODY MASS INDEX: 28.82 KG/M2 | HEIGHT: 65 IN | WEIGHT: 173 LBS | HEART RATE: 68 BPM | SYSTOLIC BLOOD PRESSURE: 114 MMHG | TEMPERATURE: 98 F | DIASTOLIC BLOOD PRESSURE: 74 MMHG | OXYGEN SATURATION: 98 %

## 2023-09-19 DIAGNOSIS — Z87.891 PERSONAL HISTORY OF NICOTINE DEPENDENCE: ICD-10-CM

## 2023-09-19 DIAGNOSIS — Z00.00 ENCOUNTER FOR GENERAL ADULT MEDICAL EXAMINATION W/OUT ABNORMAL FINDINGS: ICD-10-CM

## 2023-09-19 PROCEDURE — G0439: CPT

## 2023-09-19 PROCEDURE — 36415 COLL VENOUS BLD VENIPUNCTURE: CPT

## 2023-09-19 RX ORDER — ATORVASTATIN CALCIUM 40 MG/1
40 TABLET, FILM COATED ORAL
Refills: 0 | Status: DISCONTINUED | COMMUNITY
End: 2023-09-19

## 2023-09-25 LAB
25(OH)D3 SERPL-MCNC: 29.4 NG/ML
ALBUMIN SERPL ELPH-MCNC: 4.6 G/DL
ALP BLD-CCNC: 77 U/L
ALT SERPL-CCNC: 16 U/L
ANION GAP SERPL CALC-SCNC: 12 MMOL/L
APPEARANCE: CLEAR
AST SERPL-CCNC: 19 U/L
BASOPHILS # BLD AUTO: 0.06 K/UL
BASOPHILS NFR BLD AUTO: 0.7 %
BILIRUB SERPL-MCNC: 1.2 MG/DL
BILIRUBIN URINE: NEGATIVE
BLOOD URINE: NEGATIVE
BUN SERPL-MCNC: 14 MG/DL
CALCIUM SERPL-MCNC: 9.7 MG/DL
CHLORIDE SERPL-SCNC: 102 MMOL/L
CHOLEST SERPL-MCNC: 172 MG/DL
CO2 SERPL-SCNC: 27 MMOL/L
COLOR: YELLOW
CREAT SERPL-MCNC: 1.16 MG/DL
EGFR: 69 ML/MIN/1.73M2
EOSINOPHIL # BLD AUTO: 0.17 K/UL
EOSINOPHIL NFR BLD AUTO: 2.1 %
ESTIMATED AVERAGE GLUCOSE: 123 MG/DL
GLUCOSE QUALITATIVE U: NEGATIVE MG/DL
GLUCOSE SERPL-MCNC: 82 MG/DL
HBA1C MFR BLD HPLC: 5.9 %
HCT VFR BLD CALC: 49.2 %
HDLC SERPL-MCNC: 51 MG/DL
HGB BLD-MCNC: 16.4 G/DL
IMM GRANULOCYTES NFR BLD AUTO: 0.2 %
KETONES URINE: NEGATIVE MG/DL
LDLC SERPL CALC-MCNC: 76 MG/DL
LEUKOCYTE ESTERASE URINE: NEGATIVE
LYMPHOCYTES # BLD AUTO: 2.54 K/UL
LYMPHOCYTES NFR BLD AUTO: 31.3 %
MAN DIFF?: NORMAL
MCHC RBC-ENTMCNC: 28.8 PG
MCHC RBC-ENTMCNC: 33.3 GM/DL
MCV RBC AUTO: 86.3 FL
MONOCYTES # BLD AUTO: 0.69 K/UL
MONOCYTES NFR BLD AUTO: 8.5 %
NEUTROPHILS # BLD AUTO: 4.63 K/UL
NEUTROPHILS NFR BLD AUTO: 57.2 %
NITRITE URINE: NEGATIVE
NONHDLC SERPL-MCNC: 122 MG/DL
PH URINE: 6.5
PLATELET # BLD AUTO: 147 K/UL
POTASSIUM SERPL-SCNC: 4.6 MMOL/L
PROT SERPL-MCNC: 7.2 G/DL
PROTEIN URINE: NEGATIVE MG/DL
PSA SERPL-MCNC: 2.15 NG/ML
RBC # BLD: 5.7 M/UL
RBC # FLD: 15.5 %
SODIUM SERPL-SCNC: 141 MMOL/L
SPECIFIC GRAVITY URINE: 1.02
TRIGL SERPL-MCNC: 285 MG/DL
TSH SERPL-ACNC: 1.09 UIU/ML
UROBILINOGEN URINE: 1 MG/DL
VIT B12 SERPL-MCNC: 291 PG/ML
WBC # FLD AUTO: 8.11 K/UL

## 2023-09-25 RX ORDER — CYANOCOBALAMIN (VITAMIN B-12) 2000 MCG
2000 TABLET, EXTENDED RELEASE ORAL DAILY
Qty: 90 | Refills: 3 | Status: ACTIVE | COMMUNITY
Start: 2023-09-25 | End: 1900-01-01

## 2023-10-03 RX ORDER — CLOPIDOGREL BISULFATE 75 MG/1
75 TABLET, FILM COATED ORAL DAILY
Qty: 90 | Refills: 3 | Status: ACTIVE | COMMUNITY
Start: 2023-04-04 | End: 1900-01-01

## 2023-10-03 RX ORDER — SIMVASTATIN 40 MG/1
40 TABLET, FILM COATED ORAL
Qty: 90 | Refills: 3 | Status: ACTIVE | COMMUNITY
Start: 2023-09-19 | End: 1900-01-01

## 2023-10-04 ENCOUNTER — NON-APPOINTMENT (OUTPATIENT)
Age: 68
End: 2023-10-04

## 2023-10-04 ENCOUNTER — APPOINTMENT (OUTPATIENT)
Dept: ELECTROPHYSIOLOGY | Facility: CLINIC | Age: 68
End: 2023-10-04
Payer: COMMERCIAL

## 2023-10-05 PROCEDURE — 93294 REM INTERROG EVL PM/LDLS PM: CPT

## 2023-10-05 PROCEDURE — 93296 REM INTERROG EVL PM/IDS: CPT

## 2023-10-13 ENCOUNTER — NON-APPOINTMENT (OUTPATIENT)
Age: 68
End: 2023-10-13

## 2023-11-14 ENCOUNTER — APPOINTMENT (OUTPATIENT)
Dept: INTERNAL MEDICINE | Facility: CLINIC | Age: 68
End: 2023-11-14
Payer: COMMERCIAL

## 2023-11-14 VITALS
SYSTOLIC BLOOD PRESSURE: 136 MMHG | OXYGEN SATURATION: 97 % | HEART RATE: 77 BPM | TEMPERATURE: 98 F | DIASTOLIC BLOOD PRESSURE: 78 MMHG

## 2023-11-14 DIAGNOSIS — E78.5 HYPERLIPIDEMIA, UNSPECIFIED: ICD-10-CM

## 2023-11-14 DIAGNOSIS — N52.9 MALE ERECTILE DYSFUNCTION, UNSPECIFIED: ICD-10-CM

## 2023-11-14 DIAGNOSIS — R79.89 OTHER SPECIFIED ABNORMAL FINDINGS OF BLOOD CHEMISTRY: ICD-10-CM

## 2023-11-14 DIAGNOSIS — D69.6 THROMBOCYTOPENIA, UNSPECIFIED: ICD-10-CM

## 2023-11-14 PROCEDURE — 99214 OFFICE O/P EST MOD 30 MIN: CPT | Mod: 25

## 2023-11-14 PROCEDURE — 36415 COLL VENOUS BLD VENIPUNCTURE: CPT

## 2023-11-14 RX ORDER — SILDENAFIL 50 MG/1
50 TABLET ORAL
Qty: 10 | Refills: 3 | Status: ACTIVE | COMMUNITY
Start: 2023-11-14 | End: 1900-01-01

## 2023-11-20 LAB
ANA SER IF-ACNC: NEGATIVE
APTT BLD: 31.1 SEC
CHOLEST SERPL-MCNC: 159 MG/DL
ESTIMATED AVERAGE GLUCOSE: 120 MG/DL
FOLATE SERPL-MCNC: >20 NG/ML
HAPTOGLOB SERPL-MCNC: 78 MG/DL
HBA1C MFR BLD HPLC: 5.8 %
HCT VFR BLD CALC: 49.2 %
HCV AB SER QL: NONREACTIVE
HCV S/CO RATIO: 0.29 S/CO
HDLC SERPL-MCNC: 54 MG/DL
HGB BLD-MCNC: 16.1 G/DL
INR PPP: 0.93 RATIO
LDH SERPL-CCNC: 225 U/L
LDLC SERPL CALC-MCNC: 83 MG/DL
MCHC RBC-ENTMCNC: 29.4 PG
MCHC RBC-ENTMCNC: 32.7 GM/DL
MCV RBC AUTO: 89.8 FL
NONHDLC SERPL-MCNC: 105 MG/DL
PLATELET # BLD AUTO: 146 K/UL
PT BLD: 10.6 SEC
RBC # BLD: 5.48 M/UL
RBC # FLD: 16.8 %
TRIGL SERPL-MCNC: 122 MG/DL
VIT B12 SERPL-MCNC: 572 PG/ML
WBC # FLD AUTO: 6.34 K/UL

## 2023-12-31 ENCOUNTER — EMERGENCY (EMERGENCY)
Facility: HOSPITAL | Age: 68
LOS: 1 days | Discharge: HOME | End: 2023-12-31
Attending: EMERGENCY MEDICINE
Payer: COMMERCIAL

## 2023-12-31 VITALS
DIASTOLIC BLOOD PRESSURE: 109 MMHG | RESPIRATION RATE: 18 BRPM | OXYGEN SATURATION: 100 % | SYSTOLIC BLOOD PRESSURE: 167 MMHG | TEMPERATURE: 98 F | HEART RATE: 79 BPM

## 2023-12-31 VITALS
TEMPERATURE: 98 F | DIASTOLIC BLOOD PRESSURE: 82 MMHG | OXYGEN SATURATION: 100 % | RESPIRATION RATE: 16 BRPM | HEART RATE: 72 BPM | SYSTOLIC BLOOD PRESSURE: 147 MMHG

## 2023-12-31 LAB
A1C WITH ESTIMATED AVERAGE GLUCOSE RESULT: 5.8 % — HIGH (ref 4–5.6)
A1C WITH ESTIMATED AVERAGE GLUCOSE RESULT: 5.8 % — HIGH (ref 4–5.6)
ALBUMIN SERPL ELPH-MCNC: 4.5 G/DL — SIGNIFICANT CHANGE UP (ref 3.3–5)
ALBUMIN SERPL ELPH-MCNC: 4.5 G/DL — SIGNIFICANT CHANGE UP (ref 3.3–5)
ALP SERPL-CCNC: 80 U/L — SIGNIFICANT CHANGE UP (ref 40–120)
ALP SERPL-CCNC: 80 U/L — SIGNIFICANT CHANGE UP (ref 40–120)
ALT FLD-CCNC: 16 U/L — SIGNIFICANT CHANGE UP (ref 10–45)
ALT FLD-CCNC: 16 U/L — SIGNIFICANT CHANGE UP (ref 10–45)
ANION GAP SERPL CALC-SCNC: 10 MMOL/L — SIGNIFICANT CHANGE UP (ref 5–17)
ANION GAP SERPL CALC-SCNC: 10 MMOL/L — SIGNIFICANT CHANGE UP (ref 5–17)
APTT BLD: 30.3 SEC — SIGNIFICANT CHANGE UP (ref 24.5–35.6)
APTT BLD: 30.3 SEC — SIGNIFICANT CHANGE UP (ref 24.5–35.6)
AST SERPL-CCNC: 24 U/L — SIGNIFICANT CHANGE UP (ref 10–40)
AST SERPL-CCNC: 24 U/L — SIGNIFICANT CHANGE UP (ref 10–40)
BASOPHILS # BLD AUTO: 0.07 K/UL — SIGNIFICANT CHANGE UP (ref 0–0.2)
BASOPHILS # BLD AUTO: 0.07 K/UL — SIGNIFICANT CHANGE UP (ref 0–0.2)
BASOPHILS NFR BLD AUTO: 0.9 % — SIGNIFICANT CHANGE UP (ref 0–2)
BASOPHILS NFR BLD AUTO: 0.9 % — SIGNIFICANT CHANGE UP (ref 0–2)
BILIRUB SERPL-MCNC: 1.2 MG/DL — SIGNIFICANT CHANGE UP (ref 0.2–1.2)
BILIRUB SERPL-MCNC: 1.2 MG/DL — SIGNIFICANT CHANGE UP (ref 0.2–1.2)
BLD GP AB SCN SERPL QL: NEGATIVE — SIGNIFICANT CHANGE UP
BLD GP AB SCN SERPL QL: NEGATIVE — SIGNIFICANT CHANGE UP
BUN SERPL-MCNC: 16 MG/DL — SIGNIFICANT CHANGE UP (ref 7–23)
BUN SERPL-MCNC: 16 MG/DL — SIGNIFICANT CHANGE UP (ref 7–23)
CALCIUM SERPL-MCNC: 9.7 MG/DL — SIGNIFICANT CHANGE UP (ref 8.4–10.5)
CALCIUM SERPL-MCNC: 9.7 MG/DL — SIGNIFICANT CHANGE UP (ref 8.4–10.5)
CHLORIDE SERPL-SCNC: 104 MMOL/L — SIGNIFICANT CHANGE UP (ref 96–108)
CHLORIDE SERPL-SCNC: 104 MMOL/L — SIGNIFICANT CHANGE UP (ref 96–108)
CHOLEST SERPL-MCNC: 149 MG/DL — SIGNIFICANT CHANGE UP
CHOLEST SERPL-MCNC: 149 MG/DL — SIGNIFICANT CHANGE UP
CO2 SERPL-SCNC: 27 MMOL/L — SIGNIFICANT CHANGE UP (ref 22–31)
CO2 SERPL-SCNC: 27 MMOL/L — SIGNIFICANT CHANGE UP (ref 22–31)
CREAT SERPL-MCNC: 1.04 MG/DL — SIGNIFICANT CHANGE UP (ref 0.5–1.3)
CREAT SERPL-MCNC: 1.04 MG/DL — SIGNIFICANT CHANGE UP (ref 0.5–1.3)
EGFR: 78 ML/MIN/1.73M2 — SIGNIFICANT CHANGE UP
EGFR: 78 ML/MIN/1.73M2 — SIGNIFICANT CHANGE UP
EOSINOPHIL # BLD AUTO: 0.35 K/UL — SIGNIFICANT CHANGE UP (ref 0–0.5)
EOSINOPHIL # BLD AUTO: 0.35 K/UL — SIGNIFICANT CHANGE UP (ref 0–0.5)
EOSINOPHIL NFR BLD AUTO: 4.5 % — SIGNIFICANT CHANGE UP (ref 0–6)
EOSINOPHIL NFR BLD AUTO: 4.5 % — SIGNIFICANT CHANGE UP (ref 0–6)
ESTIMATED AVERAGE GLUCOSE: 120 MG/DL — HIGH (ref 68–114)
ESTIMATED AVERAGE GLUCOSE: 120 MG/DL — HIGH (ref 68–114)
GLUCOSE SERPL-MCNC: 115 MG/DL — HIGH (ref 70–99)
GLUCOSE SERPL-MCNC: 115 MG/DL — HIGH (ref 70–99)
HCT VFR BLD CALC: 46.9 % — SIGNIFICANT CHANGE UP (ref 39–50)
HCT VFR BLD CALC: 46.9 % — SIGNIFICANT CHANGE UP (ref 39–50)
HDLC SERPL-MCNC: 56 MG/DL — SIGNIFICANT CHANGE UP
HDLC SERPL-MCNC: 56 MG/DL — SIGNIFICANT CHANGE UP
HGB BLD-MCNC: 15.5 G/DL — SIGNIFICANT CHANGE UP (ref 13–17)
HGB BLD-MCNC: 15.5 G/DL — SIGNIFICANT CHANGE UP (ref 13–17)
IMM GRANULOCYTES NFR BLD AUTO: 0.3 % — SIGNIFICANT CHANGE UP (ref 0–0.9)
IMM GRANULOCYTES NFR BLD AUTO: 0.3 % — SIGNIFICANT CHANGE UP (ref 0–0.9)
INR BLD: 0.93 RATIO — SIGNIFICANT CHANGE UP (ref 0.85–1.18)
INR BLD: 0.93 RATIO — SIGNIFICANT CHANGE UP (ref 0.85–1.18)
LIPID PNL WITH DIRECT LDL SERPL: 76 MG/DL — SIGNIFICANT CHANGE UP
LIPID PNL WITH DIRECT LDL SERPL: 76 MG/DL — SIGNIFICANT CHANGE UP
LYMPHOCYTES # BLD AUTO: 2.22 K/UL — SIGNIFICANT CHANGE UP (ref 1–3.3)
LYMPHOCYTES # BLD AUTO: 2.22 K/UL — SIGNIFICANT CHANGE UP (ref 1–3.3)
LYMPHOCYTES # BLD AUTO: 28.8 % — SIGNIFICANT CHANGE UP (ref 13–44)
LYMPHOCYTES # BLD AUTO: 28.8 % — SIGNIFICANT CHANGE UP (ref 13–44)
MCHC RBC-ENTMCNC: 28.3 PG — SIGNIFICANT CHANGE UP (ref 27–34)
MCHC RBC-ENTMCNC: 28.3 PG — SIGNIFICANT CHANGE UP (ref 27–34)
MCHC RBC-ENTMCNC: 33 GM/DL — SIGNIFICANT CHANGE UP (ref 32–36)
MCHC RBC-ENTMCNC: 33 GM/DL — SIGNIFICANT CHANGE UP (ref 32–36)
MCV RBC AUTO: 85.7 FL — SIGNIFICANT CHANGE UP (ref 80–100)
MCV RBC AUTO: 85.7 FL — SIGNIFICANT CHANGE UP (ref 80–100)
MONOCYTES # BLD AUTO: 0.54 K/UL — SIGNIFICANT CHANGE UP (ref 0–0.9)
MONOCYTES # BLD AUTO: 0.54 K/UL — SIGNIFICANT CHANGE UP (ref 0–0.9)
MONOCYTES NFR BLD AUTO: 7 % — SIGNIFICANT CHANGE UP (ref 2–14)
MONOCYTES NFR BLD AUTO: 7 % — SIGNIFICANT CHANGE UP (ref 2–14)
NEUTROPHILS # BLD AUTO: 4.51 K/UL — SIGNIFICANT CHANGE UP (ref 1.8–7.4)
NEUTROPHILS # BLD AUTO: 4.51 K/UL — SIGNIFICANT CHANGE UP (ref 1.8–7.4)
NEUTROPHILS NFR BLD AUTO: 58.5 % — SIGNIFICANT CHANGE UP (ref 43–77)
NEUTROPHILS NFR BLD AUTO: 58.5 % — SIGNIFICANT CHANGE UP (ref 43–77)
NON HDL CHOLESTEROL: 94 MG/DL — SIGNIFICANT CHANGE UP
NON HDL CHOLESTEROL: 94 MG/DL — SIGNIFICANT CHANGE UP
NRBC # BLD: 0 /100 WBCS — SIGNIFICANT CHANGE UP (ref 0–0)
NRBC # BLD: 0 /100 WBCS — SIGNIFICANT CHANGE UP (ref 0–0)
PLATELET # BLD AUTO: 133 K/UL — LOW (ref 150–400)
PLATELET # BLD AUTO: 133 K/UL — LOW (ref 150–400)
POTASSIUM SERPL-MCNC: 4.5 MMOL/L — SIGNIFICANT CHANGE UP (ref 3.5–5.3)
POTASSIUM SERPL-MCNC: 4.5 MMOL/L — SIGNIFICANT CHANGE UP (ref 3.5–5.3)
POTASSIUM SERPL-SCNC: 4.5 MMOL/L — SIGNIFICANT CHANGE UP (ref 3.5–5.3)
POTASSIUM SERPL-SCNC: 4.5 MMOL/L — SIGNIFICANT CHANGE UP (ref 3.5–5.3)
PROT SERPL-MCNC: 7.2 G/DL — SIGNIFICANT CHANGE UP (ref 6–8.3)
PROT SERPL-MCNC: 7.2 G/DL — SIGNIFICANT CHANGE UP (ref 6–8.3)
PROTHROM AB SERPL-ACNC: 9.8 SEC — SIGNIFICANT CHANGE UP (ref 9.5–13)
PROTHROM AB SERPL-ACNC: 9.8 SEC — SIGNIFICANT CHANGE UP (ref 9.5–13)
RBC # BLD: 5.47 M/UL — SIGNIFICANT CHANGE UP (ref 4.2–5.8)
RBC # BLD: 5.47 M/UL — SIGNIFICANT CHANGE UP (ref 4.2–5.8)
RBC # FLD: 14.3 % — SIGNIFICANT CHANGE UP (ref 10.3–14.5)
RBC # FLD: 14.3 % — SIGNIFICANT CHANGE UP (ref 10.3–14.5)
RH IG SCN BLD-IMP: POSITIVE — SIGNIFICANT CHANGE UP
RH IG SCN BLD-IMP: POSITIVE — SIGNIFICANT CHANGE UP
SODIUM SERPL-SCNC: 141 MMOL/L — SIGNIFICANT CHANGE UP (ref 135–145)
SODIUM SERPL-SCNC: 141 MMOL/L — SIGNIFICANT CHANGE UP (ref 135–145)
TRIGL SERPL-MCNC: 93 MG/DL — SIGNIFICANT CHANGE UP
TRIGL SERPL-MCNC: 93 MG/DL — SIGNIFICANT CHANGE UP
TROPONIN T, HIGH SENSITIVITY RESULT: 8 NG/L — SIGNIFICANT CHANGE UP (ref 0–51)
TROPONIN T, HIGH SENSITIVITY RESULT: 8 NG/L — SIGNIFICANT CHANGE UP (ref 0–51)
WBC # BLD: 7.71 K/UL — SIGNIFICANT CHANGE UP (ref 3.8–10.5)
WBC # BLD: 7.71 K/UL — SIGNIFICANT CHANGE UP (ref 3.8–10.5)
WBC # FLD AUTO: 7.71 K/UL — SIGNIFICANT CHANGE UP (ref 3.8–10.5)
WBC # FLD AUTO: 7.71 K/UL — SIGNIFICANT CHANGE UP (ref 3.8–10.5)

## 2023-12-31 PROCEDURE — 93005 ELECTROCARDIOGRAM TRACING: CPT | Mod: XU

## 2023-12-31 PROCEDURE — 0042T: CPT | Mod: MA

## 2023-12-31 PROCEDURE — 82962 GLUCOSE BLOOD TEST: CPT

## 2023-12-31 PROCEDURE — 80061 LIPID PANEL: CPT

## 2023-12-31 PROCEDURE — 84295 ASSAY OF SERUM SODIUM: CPT

## 2023-12-31 PROCEDURE — 70496 CT ANGIOGRAPHY HEAD: CPT | Mod: MA

## 2023-12-31 PROCEDURE — 85018 HEMOGLOBIN: CPT

## 2023-12-31 PROCEDURE — 99285 EMERGENCY DEPT VISIT HI MDM: CPT

## 2023-12-31 PROCEDURE — 85014 HEMATOCRIT: CPT

## 2023-12-31 PROCEDURE — 84132 ASSAY OF SERUM POTASSIUM: CPT

## 2023-12-31 PROCEDURE — 70498 CT ANGIOGRAPHY NECK: CPT | Mod: MA

## 2023-12-31 PROCEDURE — 93280 PM DEVICE PROGR EVAL DUAL: CPT | Mod: 26,GC

## 2023-12-31 PROCEDURE — 93288 INTERROG EVL PM/LDLS PM IP: CPT

## 2023-12-31 PROCEDURE — 86900 BLOOD TYPING SEROLOGIC ABO: CPT

## 2023-12-31 PROCEDURE — 99285 EMERGENCY DEPT VISIT HI MDM: CPT | Mod: 25

## 2023-12-31 PROCEDURE — 86901 BLOOD TYPING SEROLOGIC RH(D): CPT

## 2023-12-31 PROCEDURE — 84484 ASSAY OF TROPONIN QUANT: CPT

## 2023-12-31 PROCEDURE — 83036 HEMOGLOBIN GLYCOSYLATED A1C: CPT

## 2023-12-31 PROCEDURE — 85025 COMPLETE CBC W/AUTO DIFF WBC: CPT

## 2023-12-31 PROCEDURE — 83605 ASSAY OF LACTIC ACID: CPT

## 2023-12-31 PROCEDURE — 80053 COMPREHEN METABOLIC PANEL: CPT

## 2023-12-31 PROCEDURE — 70450 CT HEAD/BRAIN W/O DYE: CPT | Mod: 26,59,MA

## 2023-12-31 PROCEDURE — 36415 COLL VENOUS BLD VENIPUNCTURE: CPT

## 2023-12-31 PROCEDURE — 82803 BLOOD GASES ANY COMBINATION: CPT

## 2023-12-31 PROCEDURE — 70498 CT ANGIOGRAPHY NECK: CPT | Mod: 26,MA

## 2023-12-31 PROCEDURE — 86850 RBC ANTIBODY SCREEN: CPT

## 2023-12-31 PROCEDURE — 85610 PROTHROMBIN TIME: CPT

## 2023-12-31 PROCEDURE — 82435 ASSAY OF BLOOD CHLORIDE: CPT

## 2023-12-31 PROCEDURE — 70450 CT HEAD/BRAIN W/O DYE: CPT | Mod: MA

## 2023-12-31 PROCEDURE — 70496 CT ANGIOGRAPHY HEAD: CPT | Mod: 26,MA

## 2023-12-31 PROCEDURE — 82947 ASSAY GLUCOSE BLOOD QUANT: CPT

## 2023-12-31 PROCEDURE — 82330 ASSAY OF CALCIUM: CPT

## 2023-12-31 PROCEDURE — 85730 THROMBOPLASTIN TIME PARTIAL: CPT

## 2023-12-31 NOTE — ED PROVIDER NOTE - PROGRESS NOTE
Contacted patient. He reports that he \"feels better and does not need to take his medications anymore.\" Educated pt on the fact that the medications are not only for depression but for the voices he experiences. He states that he no longer hears the voices. Writer attempted to tell pt that the medications most likely are attributing for the resolution of his symptoms and by stopping the medication he could potentially experience discontinuation syndrome, audial hallucinations again. Pt reports that he no longer wishes to take his medications. Writer asked if pt was experiencing unwanted side effects and that is why he no longer wished to take medications however he denied this. Patient encouraged to continue medications. He states he is going to stop them. Pt encouraged to contact writer regarding this if symptoms worsen. Denies SI/HI.    Stable.

## 2023-12-31 NOTE — ED PROVIDER NOTE - ATTENDING CONTRIBUTION TO CARE
Attending MD Reveles:  I have seen and examined this patient and fully participated in the care of this patient as the teaching attending. I personally made/approved the management plan and take responsibility for the patient management.        67-year-old gentleman with history of TIA on aspirin and Plavix, pacemaker, PFO presenting for evaluation of numbness of the left face arm and leg since 7 PM last night.  Also complaining of hematochezia for several days.  Code stroke was activated in triage.        NIH Stroke Scale Calculator    1.  a. Level of consciousness: Alert (0 points)      b. Patient asked current month and age: Answers both correctly (0 points)      c. Patient asked to open And close eyes: Obeys both correctly (0 points)  2.  Best gaze: Normal (0 points)  3.  Visual field testing: No visual field loss (0 points)  4.  Facial paresis: Normal symmetrical movement (0 points)  5.  a. Motor function - left arm: Normal (0 points)      b. Motor function - right arm: Normal (0 points)  6.  a. Motor function - left leg: Normal (0 points)      b. Motor function - right leg: Normal (0 points)  7.  Limb ataxia: No ataxia (0 points)  8.  Sensory: Mild to moderate decrease in sensation (1 point)  9.  Best language: No aphasia (0 points)  10. Dysarthria: Normal articulation (0 points)  11. Exinction and inattention: Normal (0 points)    Total: 1 point.      Patient presenting for evaluation of lateralizing numbness onset 7 PM night prior, NIH stroke scale thus far 1 scoring for mild hypoesthesia on the left, also incidentally patient complaining of hematochezia.  Plan for CT angiogram head and neck CT head screening labs to rule out critical anemia, coagulation profile type and screen.  Will perform EVANGELINA once patient back from neuroimaging.  Patient's not a candidate for thrombolysis given duration of symptoms, will follow-up imaging to exclude large vessel stenosis or occlusion but low suspicion for such.  Further workup will be determined after degree of GI bleeding is elucidated and in accordance with stroke neurology recommendations.        *The above represents an initial assessment/impression. Please refer to progress notes for potential changes in patient clinical course* Attending MD Reveles:  I have seen and examined this patient and fully participated in the care of this patient as the teaching attending. I personally made/approved the management plan and take responsibility for the patient management.        67-year-old gentleman with history of TIA on aspirin and Plavix, pacemaker, PFO presenting for evaluation of numbness of the left face arm and leg since 7 PM last night.  Also complaining of hematochezia for several days.  Code stroke was activated in triage.        NIH Stroke Scale Calculator    1.  a. Level of consciousness: Alert (0 points)      b. Patient asked current month and age: Answers both correctly (0 points)      c. Patient asked to open And close eyes: Obeys both correctly (0 points)  2.  Best gaze: Normal (0 points)  3.  Visual field testing: No visual field loss (0 points)  4.  Facial paresis: Normal symmetrical movement (0 points)  5.  a. Motor function - left arm: Normal (0 points)      b. Motor function - right arm: Normal (0 points)  6.  a. Motor function - left leg: Normal (0 points)      b. Motor function - right leg: Normal (0 points)  7.  Limb ataxia: No ataxia (0 points)  8.  Sensory: Mild to moderate decrease in sensation (1 point)  9.  Best language: No aphasia (0 points)  10. Dysarthria: Normal articulation (0 points)  11. Exinction and inattention: Normal (0 points)    Total: 1 point.      Patient presenting for evaluation of lateralizing numbness onset 7 PM night prior, NIH stroke scale thus far 1 scoring for mild hypoesthesia on the left, also incidentally patient complaining of hematochezia.  Plan for CT angiogram head and neck CT head screening labs to rule out critical anemia, coagulation profile type and screen.  Will perform EVANGELINA once patient back from neuroimaging.  Patient's not a candidate for thrombolysis given duration of symptoms and presence of possible active GI bleeding, will follow-up imaging to exclude large vessel stenosis or occlusion but low suspicion for such.  Further workup will be determined after degree of GI bleeding is elucidated and in accordance with stroke neurology recommendations.        *The above represents an initial assessment/impression. Please refer to progress notes for potential changes in patient clinical course*

## 2023-12-31 NOTE — ED PROVIDER NOTE - PHYSICAL EXAMINATION
CONSTITUTIONAL: NAD  SKIN: Warm dry  HEAD: NCAT  EYES: NL inspection  ENT: MMM  NECK: Supple  CARD: RRR  RESP: CTAB  ABD: S/NT no R/G  EVANGELINA: with RN tech as chaparone, brown stool, no visible external hemorrhoid or internal hemorrhoids palpated.   EXT: no edema  NEURO: Grossly non-focal except for subjective L face/hand/leg dec sensation but does feel touch still, no cranial nerve deficits grossly, able to ambulate, finger-to-nose intact  PSYCH: Cooperative, appropriate.

## 2023-12-31 NOTE — CONSULT NOTE ADULT - ASSESSMENT
HUMBERTO SMITH is a 68y (1955) right handed man with a PMHx significant for HTN, HLD, former smoker, prior L MCA and B/L cerebellar infarcts with no residual deficits, PFO closure, bradycardia s/p PPM (2021) who presents as code stroke for acute onset left sided numbness. Patient reports symptoms started at 7PM last night and did not improved so he came to the ED this morning. Reports mildly decreased sensation to light touch on L side (about 90% of normal)    LKW: 19:00 12/30  NIHSS:  1  Baseline MRS: 0  Not a Teneceteplase candidate due to out of window  Not a thrombectomy candidate due to no LVO    Impression:      Recommendations:    []  []  []  []  []  []    Discussed with stroke fellow Jaya Park MD under supervision of attending Dr. Darnell  regarding decision against candidacy for Tenecteplase / thrombectomy. Will be formally staffed on morning rounds with attending. Recommendations will be complete once signed by attending. HUMBERTO SMITH is a 68y (1955) right handed man with a PMHx significant for HTN, HLD, former smoker, prior L MCA and B/L cerebellar infarcts with no residual deficits, PFO closure, bradycardia s/p PPM (2021) who presents as code stroke for acute onset left sided numbness. Patient reports symptoms started at 7PM last night and did not improved so he came to the ED this morning. Reports mildly decreased sensation to light touch on L side (about 90% of normal).    LKW: 19:00 12/30  NIHSS:  1  Baseline MRS: 0  Not a Teneceteplase candidate due to out of window  Not a thrombectomy candidate due to no LVO    Impression:  1. Acute onset left sided numbness (mild decrease to light touch only) in patient with multiple vascular risk factors and previous hx of stroke with concern for possible acute ischemic event leading to R brain dysfunction. Non-disabling deficit, CTA unchanged from prior (chronic L vert occlusion) and CTP with no perfusion deficit   2. Previous L MCA infarct and B/L cerebellar infarcts     Recommendations:    [] Continue with Aspirin 81mg and Plavix 75mg daily  [] Continue home dose statin  [] Please send HgbA1C, fasting lipid panel, CBC, CMP, coag panel  [] MRI brain w/o con, can be done outpatient   [] Obtain EKG, TTE can be done outpatient   [] Allow for permissive HTN for 24-48 hrs, followed buy gradual normotension  [] No further inpatient Neurologic workup from stroke standpoint at this time. Patient can follow up with his Neurologist, Dr. Bautista Darnell as outpatient. Please instruct the patient/family to call [phone] to schedule an appointment within the next 1-2 weeks. Office is located at [address] [place] [address].    Discussed with stroke fellow Sukh Park MD under supervision of attending Dr. Darnell regarding decision against candidacy for Tenecteplase / thrombectomy. Will be formally staffed on morning rounds with attending. Recommendations will be complete once signed by attending.   HUMBERTO SMITH is a 68y (1955) right handed man with a PMHx significant for HTN, HLD, former smoker, prior L MCA and B/L cerebellar infarcts with no residual deficits, PFO closure, bradycardia s/p PPM (2021) who presents as code stroke for acute onset left sided numbness. Patient reports symptoms started at 7PM last night and did not improved so he came to the ED this morning. Reports mildly decreased sensation to light touch on L side (about 90% of normal).    LKW: 19:00 12/30  NIHSS:  1  Baseline MRS: 0  Not a Teneceteplase candidate due to out of window  Not a thrombectomy candidate due to no LVO    Impression:  1. Acute onset left sided numbness (mild decrease to light touch only) in patient with multiple vascular risk factors and previous hx of stroke with concern for possible acute ischemic event leading to R brain dysfunction. Non-disabling deficit, CTA unchanged from prior (chronic L vert occlusion) and CTP with no perfusion deficit   2. Previous L MCA infarct and B/L cerebellar infarcts     Recommendations:    [] Continue with Aspirin 81mg and Plavix 75mg daily  [] Continue home dose statin  [] Please send HgbA1C, fasting lipid panel, CBC, CMP, coag panel  [] MRI brain w/o con, can be done outpatient   [] Obtain EKG, TTE can be done outpatient   [] Allow for permissive HTN for 24-48 hrs, followed buy gradual normotension  [] No further inpatient Neurologic workup from stroke standpoint at this time. Patient can follow up with his Neurologist, Dr. Bautista Darnell as outpatient. Please instruct the patient/family to call [phone] to schedule an appointment within the next 1-2 weeks. Office is located at [address] [place] [address].    Discussed with stroke fellow Sukh Park MD under supervision of attending Dr. Darnell regarding decision against candidacy for Tenecteplase / thrombectomy. Will be formally staffed on morning rounds with attending. Recommendations will be complete once signed by attending.

## 2023-12-31 NOTE — CONSULT NOTE ADULT - SUBJECTIVE AND OBJECTIVE BOX
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 Patient reports symptoms started at 7PM last night and did not improved so he came to the ED this morning. Reports mildly decreased sensation to light touch on L side (about 90% of normal). Endorses mild dizziness, denies headache denies any slurred speech or focal weakness. Patient of Dr. Darnell. He is on ASA/Plavix.  Reports Left arm pain, denies any trauma but he reports lifting heavy things daily as he works as a .     LKW: 19:00 12/30  NIHSS:  1  Baseline MRS: 0    Review of Systems:    CONSTITUTIONAL: No fevers or chills  EYES AND ENT: No visual changes or no throat pain   NECK: No pain or stiffness  RESPIRATORY: No hemoptysis or shortness of breath  CARDIOVASCULAR: No chest pain or palpitations  GASTROINTESTINAL: No melena or hematochezia  GENITOURINARY: No dysuria or hematuria  NEUROLOGICAL: +As stated in HPI above  SKIN: No itching, burning, rashes, or lesions   All other review of systems is negative unless indicated above.    Allergies:  No Known Allergies      PMHx/PSHx/Family Hx: As above, otherwise see below   HLD (hyperlipidemia)  Transient ischemic attack (TIA)  PFO (patent foramen ovale)      Social Hx:  No current use of tobacco, alcohol, or illicit drugs    Medications:  MEDICATIONS  (STANDING):    MEDICATIONS  (PRN):      Vitals:  T(C): 36.5 (12-31-23 @ 07:06), Max: 36.5 (12-31-23 @ 07:06)  HR: 79 (12-31-23 @ 07:06) (79 - 79)  BP: 167/109 (12-31-23 @ 07:06) (167/109 - 167/109)  RR: 18 (12-31-23 @ 07:06) (18 - 18)  SpO2: 100% (12-31-23 @ 07:06) (100% - 100%)    Physical Examination:   General - NAD  Cardiovascular - Peripheral pulses palpable, no edema  Eyes - Fundoscopy not performed due to safety precautions in the setting of the COVID-19 pandemic    Neurologic Exam:  Mental status - Awake, Alert, Oriented to person, place, and time. Speech fluent, repetition and naming intact. Follows simple and complex commands. Attention/concentration, recent and remote memory (including registration and recall), and fund of knowledge intact    Cranial nerves - PERRLA, VFF, EOMI, face sensation (V1-V3) mildly decreased to light touch on L , facial strength intact without asymmetry b/l, hearing intact b/l, palate with symmetric elevation, trapezius 5/5 strength b/l, tongue midline on protrusion with full lateral movement    Motor - Normal bulk and tone throughout. No pronator drift.  Strength testing            Deltoid      Biceps      Triceps     Wrist Extension    Wrist Flexion     Interossei         R            5                 5               5                     5                 5                        5                 5  L             5                 5               5                     5               5                        5                 5              Hip Flexion    Hip Extension    Knee Flexion    Knee Extension    Dorsiflexion    Plantar Flexion  R              5                           5                5                  5                            5                          5  L              5                           5                5                 5                            5                          5    Sensation - decreased to light touch on L    DTR's -             Biceps      Triceps     Brachioradialis      Patellar    Ankle    Toes/plantar response  R             2+             2+                  2+                       2+            2+                 Down  L              2+             2+                 2+                        2+           2+                 Down    Coordination - Finger to Nose intact b/l. No tremors appreciated    Gait and station - Normal casual gait.    Labs:                        15.5   7.71  )-----------( 133      ( 31 Dec 2023 07:18 )             46.9       CAPILLARY BLOOD GLUCOSE  116 (31 Dec 2023 07:28)      POCT Blood Glucose.: 116 mg/dL (31 Dec 2023 07:06)        Radiology:       Neurology - Consult Note    -  Spectra: [phone] ([facility]), [phone] ([facility])  -    HPI: Patient HUMBERTO SMITH is a 68y (1955) right handed man with a PMHx significant for HTN, HLD, former smoker, prior L MCA and B/L cerebellar infarcts with no residual deficits, PFO closure, bradycardia s/p PPM (2021) who presents as code stroke for acute onset left sided numbness. Patient reports symptoms started at 7PM last night and did not improved so he came to the ED this morning. Reports mildly decreased sensation to light touch on L side (about 90% of normal). Endorses mild dizziness, denies headache denies any slurred speech or focal weakness. Patient of Dr. Darnell. He is on ASA/Plavix.  Reports Left arm pain, denies any trauma but he reports lifting heavy things daily as he works as a .     LKW: 19:00 12/30  NIHSS:  1  Baseline MRS: 0    Review of Systems:    CONSTITUTIONAL: No fevers or chills  EYES AND ENT: No visual changes or no throat pain   NECK: No pain or stiffness  RESPIRATORY: No hemoptysis or shortness of breath  CARDIOVASCULAR: No chest pain or palpitations  GASTROINTESTINAL: No melena or hematochezia  GENITOURINARY: No dysuria or hematuria  NEUROLOGICAL: +As stated in HPI above  SKIN: No itching, burning, rashes, or lesions   All other review of systems is negative unless indicated above.    Allergies:  No Known Allergies      PMHx/PSHx/Family Hx: As above, otherwise see below   HLD (hyperlipidemia)  Transient ischemic attack (TIA)  PFO (patent foramen ovale)      Social Hx:  No current use of tobacco, alcohol, or illicit drugs    Medications:  MEDICATIONS  (STANDING):    MEDICATIONS  (PRN):      Vitals:  T(C): 36.5 (12-31-23 @ 07:06), Max: 36.5 (12-31-23 @ 07:06)  HR: 79 (12-31-23 @ 07:06) (79 - 79)  BP: 167/109 (12-31-23 @ 07:06) (167/109 - 167/109)  RR: 18 (12-31-23 @ 07:06) (18 - 18)  SpO2: 100% (12-31-23 @ 07:06) (100% - 100%)    Physical Examination:   General - NAD  Cardiovascular - Peripheral pulses palpable, no edema  Eyes - Fundoscopy not performed due to safety precautions in the setting of the COVID-19 pandemic    Neurologic Exam:  Mental status - Awake, Alert, Oriented to person, place, and time. Speech fluent, repetition and naming intact. Follows simple and complex commands. Attention/concentration, recent and remote memory (including registration and recall), and fund of knowledge intact    Cranial nerves - PERRLA, VFF, EOMI, face sensation (V1-V3) mildly decreased to light touch on L , facial strength intact without asymmetry b/l, hearing intact b/l, palate with symmetric elevation, trapezius 5/5 strength b/l, tongue midline on protrusion with full lateral movement    Motor - Normal bulk and tone throughout. No pronator drift.  Strength testing            Deltoid      Biceps      Triceps     Wrist Extension    Wrist Flexion     Interossei         R            5                 5               5                     5                 5                        5                 5  L             5                 5               5                     5               5                        5                 5              Hip Flexion    Hip Extension    Knee Flexion    Knee Extension    Dorsiflexion    Plantar Flexion  R              5                           5                5                  5                            5                          5  L              5                           5                5                 5                            5                          5    Sensation - decreased to light touch on L    DTR's -             Biceps      Triceps     Brachioradialis      Patellar    Ankle    Toes/plantar response  R             2+             2+                  2+                       2+            2+                 Down  L              2+             2+                 2+                        2+           2+                 Down    Coordination - Finger to Nose intact b/l. No tremors appreciated    Gait and station - Normal casual gait.    Labs:                        15.5   7.71  )-----------( 133      ( 31 Dec 2023 07:18 )             46.9       CAPILLARY BLOOD GLUCOSE  116 (31 Dec 2023 07:28)      POCT Blood Glucose.: 116 mg/dL (31 Dec 2023 07:06)        Radiology:       Neurology - Consult Note    -  Spectra: [phone] ([facility]), [phone] ([facility])  -    HPI: Patient HUMBERTO SMITH is a 68y (1955) right handed man with a PMHx significant for HTN, HLD, former smoker, prior L MCA and B/L cerebellar infarcts with no residual deficits, PFO closure, bradycardia s/p PPM (2021) who presents as code stroke for acute onset left sided numbness. Patient reports symptoms started at 7PM last night and did not improved so he came to the ED this morning. Reports mildly decreased sensation to light touch on L side (about 90% of normal). Endorses mild dizziness, denies headache denies any slurred speech or focal weakness. Patient of Dr. Darnell. He is on ASA/Plavix.  Reports Left arm pain, denies any trauma but he reports lifting heavy things daily as he works as a .     LKW: 19:00 12/30  NIHSS:  1  Baseline MRS: 0    Review of Systems:    CONSTITUTIONAL: No fevers or chills  EYES AND ENT: No visual changes or no throat pain   NECK: No pain or stiffness  RESPIRATORY: No hemoptysis or shortness of breath  CARDIOVASCULAR: No chest pain or palpitations  GASTROINTESTINAL: No melena or hematochezia  GENITOURINARY: No dysuria or hematuria  NEUROLOGICAL: +As stated in HPI above  SKIN: No itching, burning, rashes, or lesions   All other review of systems is negative unless indicated above.    Allergies:  No Known Allergies      PMHx/PSHx/Family Hx: As above, otherwise see below   HLD (hyperlipidemia)  Transient ischemic attack (TIA)  PFO (patent foramen ovale)      Social Hx:  No current use of tobacco, alcohol, or illicit drugs    Medications:  MEDICATIONS  (STANDING):    MEDICATIONS  (PRN):      Vitals:  T(C): 36.5 (12-31-23 @ 07:06), Max: 36.5 (12-31-23 @ 07:06)  HR: 79 (12-31-23 @ 07:06) (79 - 79)  BP: 167/109 (12-31-23 @ 07:06) (167/109 - 167/109)  RR: 18 (12-31-23 @ 07:06) (18 - 18)  SpO2: 100% (12-31-23 @ 07:06) (100% - 100%)    Physical Examination:   General - NAD  Cardiovascular - Peripheral pulses palpable, no edema  Eyes - Fundoscopy not performed due to safety precautions in the setting of the COVID-19 pandemic    Neurologic Exam:  Mental status - Awake, Alert, Oriented to person, place, and time. Speech fluent, repetition and naming intact. Follows simple and complex commands. Attention/concentration, recent and remote memory (including registration and recall), and fund of knowledge intact    Cranial nerves - PERRLA, VFF, EOMI, face sensation (V1-V3) mildly decreased to light touch on L , facial strength intact without asymmetry b/l, hearing intact b/l, palate with symmetric elevation, trapezius 5/5 strength b/l, tongue midline on protrusion with full lateral movement    Motor - Normal bulk and tone throughout. No pronator drift.  Strength testing            Deltoid      Biceps      Triceps     Wrist Extension    Wrist Flexion     Interossei         R            5                 5               5                     5                 5                        5                 5  L             5                 5               5                     5               5                        5                 5              Hip Flexion    Hip Extension    Knee Flexion    Knee Extension    Dorsiflexion    Plantar Flexion  R              5                           5                5                  5                            5                          5  L              5                           5                5                 5                            5                          5    Sensation - mildly decreased to light touch on L. Intact to temp and vibration    DTR's -             Biceps      Triceps     Brachioradialis      Patellar    Ankle    Toes/plantar response  R             2+             2+                  2+                2+            2+                mute  L              2+             2+                 2+                 2+           2+                 mute    Coordination - Finger to Nose intact b/l. No tremors appreciated    Gait and station - Normal casual gait.    Labs:                        15.5   7.71  )-----------( 133      ( 31 Dec 2023 07:18 )             46.9       CAPILLARY BLOOD GLUCOSE  116 (31 Dec 2023 07:28)      POCT Blood Glucose.: 116 mg/dL (31 Dec 2023 07:06)      Radiology:    CT Brain Stroke Protocol (12.31.23 @ 07:30)   FINDINGS:  Chronic left parietal and bilateral cerebellar hemisphere infarcts. No   new loss of gray-white differentiation.  There is no acute intracranial hemorrhage, mass effect, hydrocephalus, or   midline shift.  There are no extra-axial collections.  The visualized paranasal sinuses and mastoid air cells are clear.  The calvarium is intact. Bilateral lens replacements.    IMPRESSION:  No acute intra-cranial hemorrhage, mass effect, or midline shift.>    CT Brain Perfusion Maps Stroke (12.31.23 @ 07:31)   CT PERFUSION:    CBF <30%: 0 ml  Tmax > 6s: 0 ml  Mismatch volume=  0 ml    At the imaged levels, normal mean transit time, cerebral blood flow, and   cerebral blood volume are demonstrated bilaterally. There is no evidence   of irreversible, potentially reversible, or compensated perfusion   abnormality within these portions of the brain.    CTA BRAIN    INTERNAL CAROTID ARTERIES:  The intracranial segments of the ICA are   patent without hemodynamically significant stenosis, occlusion, or   aneurysm. The ICA bifurcations are unremarkable.    ANTERIOR CEREBRAL ARTERIES: No flow-limiting stenosis or occlusion.   Anterior communicating artery is unremarkable without aneurysm.    MIDDLE CEREBRAL ARTERIES: No flow-limiting stenosis or occlusion. MCA   bifurcations are unremarkable without aneurysm.    POSTERIOR CEREBRAL ARTERIES: Hypoplastic right P1 with dominant posterior   communicating artery. No flow-limiting stenosis or occlusion. Posterior   communicating arteries are patent bilaterally.    VERTEBROBASILAR SYSTEM: No flow-limiting stenosis or occlusion. Basilar   tip is unremarkable.    CTA NECK    RIGHT CAROTID SYSTEM: Normal in course and caliber without flow-limiting   stenosis or occlusion.    LEFT CAROTID SYSTEM: Normal in course and caliber without flow-limiting   stenosis or occlusion.    VERTEBRAL SYSTEM: Chronically occluded/hypoplastic left vertebral artery,   similar to prior. Origin of the vertebral arteries are unremarkable.    AORTIC ARCH: Bovine aortic arch.    Subcentimeter left thyroid nodule.Left chest wall pacemaker.    IMPRESSION:    CT PERFUSION: Normal perfusion..    CTA BRAIN: Patent intracranial circulation. No flow-limiting stenosis or   occlusion.    CTA NECK: Chronically occluded/hypoplastic left vertebral artery, similar   to 8/26/2021.       Neurology - Consult Note    -  Spectra: [phone] ([facility]), [phone] ([facility])  -    HPI: Patient HUMBERTO SMITH is a 68y (1955) right handed man with a PMHx significant for HTN, HLD, former smoker, prior L MCA and B/L cerebellar infarcts with no residual deficits, PFO closure, bradycardia s/p PPM (2021) who presents as code stroke for acute onset left sided numbness. Patient reports symptoms started at 7PM last night and did not improved so he came to the ED this morning. Reports mildly decreased sensation to light touch on L side (about 90% of normal). Endorses mild dizziness, denies headache denies any slurred speech or focal weakness. Patient of Dr. Darnell. He is on ASA/Plavix.  Reports Left arm pain, denies any trauma but he reports lifting heavy things daily as he works as a .     LKW: 19:00 12/30  NIHSS:  1  Baseline MRS: 0    Review of Systems:    CONSTITUTIONAL: No fevers or chills  EYES AND ENT: No visual changes or no throat pain   NECK: No pain or stiffness  RESPIRATORY: No hemoptysis or shortness of breath  CARDIOVASCULAR: No chest pain or palpitations  GASTROINTESTINAL: No melena or hematochezia  GENITOURINARY: No dysuria or hematuria  NEUROLOGICAL: +As stated in HPI above  SKIN: No itching, burning, rashes, or lesions   All other review of systems is negative unless indicated above.    Allergies:  No Known Allergies      PMHx/PSHx/Family Hx: As above, otherwise see below   HLD (hyperlipidemia)  Transient ischemic attack (TIA)  PFO (patent foramen ovale)      Social Hx:  No current use of tobacco, alcohol, or illicit drugs    Medications:  MEDICATIONS  (STANDING):    MEDICATIONS  (PRN):      Vitals:  T(C): 36.5 (12-31-23 @ 07:06), Max: 36.5 (12-31-23 @ 07:06)  HR: 79 (12-31-23 @ 07:06) (79 - 79)  BP: 167/109 (12-31-23 @ 07:06) (167/109 - 167/109)  RR: 18 (12-31-23 @ 07:06) (18 - 18)  SpO2: 100% (12-31-23 @ 07:06) (100% - 100%)    Physical Examination:   General - NAD  Cardiovascular - Peripheral pulses palpable, no edema  Eyes - Fundoscopy not performed due to safety precautions in the setting of the COVID-19 pandemic    Neurologic Exam:  Mental status - Awake, Alert, Oriented to person, place, and time. Speech fluent, repetition and naming intact. Follows simple and complex commands. Attention/concentration, recent and remote memory (including registration and recall), and fund of knowledge intact    Cranial nerves - PERRLA, VFF, EOMI, face sensation (V1-V3) mildly decreased to light touch on L , facial strength intact without asymmetry b/l, hearing intact b/l, palate with symmetric elevation, trapezius 5/5 strength b/l, tongue midline on protrusion with full lateral movement    Motor - Normal bulk and tone throughout. No pronator drift.  Strength testing            Deltoid      Biceps      Triceps     Wrist Extension    Wrist Flexion     Interossei         R            5                 5               5                     5                 5                        5                 5  L             5                 5               5                     5               5                        5                 5              Hip Flexion    Hip Extension    Knee Flexion    Knee Extension    Dorsiflexion    Plantar Flexion  R              5                           5                5                  5                            5                          5  L              5                           5                5                 5                            5                          5    Sensation - mildly decreased to light touch on L. Intact to temp and vibration    DTR's -             Biceps      Triceps     Brachioradialis      Patellar    Ankle    Toes/plantar response  R             2+             2+                  2+                2+            2+                mute  L              2+             2+                 2+                 2+           2+                 mute    Coordination - Finger to Nose intact b/l. No tremors appreciated    Gait and station - Normal casual gait.    Labs:                        15.5   7.71  )-----------( 133      ( 31 Dec 2023 07:18 )             46.9       CAPILLARY BLOOD GLUCOSE  116 (31 Dec 2023 07:28)      POCT Blood Glucose.: 116 mg/dL (31 Dec 2023 07:06)      Radiology:    CT Brain Stroke Protocol (12.31.23 @ 07:30)   FINDINGS:  Chronic left parietal and bilateral cerebellar hemisphere infarcts. No   new loss of gray-white differentiation.  There is no acute intracranial hemorrhage, mass effect, hydrocephalus, or   midline shift.  There are no extra-axial collections.  The visualized paranasal sinuses and mastoid air cells are clear.  The calvarium is intact. Bilateral lens replacements.    IMPRESSION:  No acute intra-cranial hemorrhage, mass effect, or midline shift.>    CT Brain Perfusion Maps Stroke (12.31.23 @ 07:31)   CT PERFUSION:    CBF <30%: 0 ml  Tmax > 6s: 0 ml  Mismatch volume=  0 ml    At the imaged levels, normal mean transit time, cerebral blood flow, and   cerebral blood volume are demonstrated bilaterally. There is no evidence   of irreversible, potentially reversible, or compensated perfusion   abnormality within these portions of the brain.    CTA BRAIN    INTERNAL CAROTID ARTERIES:  The intracranial segments of the ICA are   patent without hemodynamically significant stenosis, occlusion, or   aneurysm. The ICA bifurcations are unremarkable.    ANTERIOR CEREBRAL ARTERIES: No flow-limiting stenosis or occlusion.   Anterior communicating artery is unremarkable without aneurysm.    MIDDLE CEREBRAL ARTERIES: No flow-limiting stenosis or occlusion. MCA   bifurcations are unremarkable without aneurysm.    POSTERIOR CEREBRAL ARTERIES: Hypoplastic right P1 with dominant posterior   communicating artery. No flow-limiting stenosis or occlusion. Posterior   communicating arteries are patent bilaterally.    VERTEBROBASILAR SYSTEM: No flow-limiting stenosis or occlusion. Basilar   tip is unremarkable.    CTA NECK    RIGHT CAROTID SYSTEM: Normal in course and caliber without flow-limiting   stenosis or occlusion.    LEFT CAROTID SYSTEM: Normal in course and caliber without flow-limiting   stenosis or occlusion.    VERTEBRAL SYSTEM: Chronically occluded/hypoplastic left vertebral artery,   similar to prior. Origin of the vertebral arteries are unremarkable.    AORTIC ARCH: Bovine aortic arch.    Subcentimeter left thyroid nodule.Left chest wall pacemaker.    IMPRESSION:    CT PERFUSION: Normal perfusion..    CTA BRAIN: Patent intracranial circulation. No flow-limiting stenosis or   occlusion.    CTA NECK: Chronically occluded/hypoplastic left vertebral artery, similar   to 8/26/2021.

## 2023-12-31 NOTE — ED PROVIDER NOTE - PATIENT PORTAL LINK FT
You can access the FollowMyHealth Patient Portal offered by [facility] by registering at the following website: [email or web address]. By joining [employer]’s FollowMyHealth portal, you will also be able to view your health information using other applications (apps) compatible with our system. You can access the FollowMyHealth Patient Portal offered by [facility] by registering at the following website: [email or web address]. By joining [employer] [employer]’s FollowMyHealth portal, you will also be able to view your health information using other applications (apps) compatible with our system.

## 2023-12-31 NOTE — ED PROVIDER NOTE - OBJECTIVE STATEMENT
67yo M with PMH of TIA, PFO, vertebral stenosis, CHB s/p PPM presents for L body numbness since 12/30 7pm. Noticed it then, mild dec in sensation thru leg/arm/torso and face. No other complaints so went to sleep but when he woke up still present to came to ED. No weakness, diff with vision, hearing. Also endorsing some blood in stool for 'a few days' but no abd pain. Having L upper shoulder/arm pains but no mid chest pain. No SOB. 69yo M with PMH of TIA, PFO, vertebral stenosis, CHB s/p PPM presents for L body numbness since 12/30 7pm. Noticed it then, mild dec in sensation thru leg/arm/torso and face. No other complaints so went to sleep but when he woke up still present to came to ED. No weakness, diff with vision, hearing. Also endorsing some blood in stool for 'a few days' but no abd pain. Having L upper shoulder/arm pains but no mid chest pain. No SOB. 69yo M with PMH of TIA, PFO, vertebral stenosis, CHB s/p PPM presents for L body numbness since 12/30 7pm. Noticed it then, mild dec in sensation thru leg/arm/torso and face. No other complaints so went to sleep but when he woke up still present to came to ED. No weakness, diff with vision, hearing. Also endorsing some blood in stool for 'a few days' but no abd pain. Having L upper shoulder/arm pains but no mid chest pain. No SOB.    Upon further questioning, the 'blood in stool' has been ongoing for years. Intermittent, worse with spicy food, told he had 'hemorrhoid'. No recent inc, didn't happen today. Sometimes dark red or blackish but nothing recent. Had a colo 5yrs ago and had a few polyps removed. No abd pain. 67yo M with PMH of TIA, PFO, vertebral stenosis, CHB s/p PPM presents for L body numbness since 12/30 7pm. Noticed it then, mild dec in sensation thru leg/arm/torso and face. No other complaints so went to sleep but when he woke up still present to came to ED. No weakness, diff with vision, hearing. Also endorsing some blood in stool for 'a few days' but no abd pain. Having L upper shoulder/arm pains but no mid chest pain. No SOB.    Upon further questioning, the 'blood in stool' has been ongoing for years. Intermittent, worse with spicy food, told he had 'hemorrhoid'. No recent inc, didn't happen today. Sometimes dark red or blackish but nothing recent. Had a colo 5yrs ago and had a few polyps removed. No abd pain.

## 2023-12-31 NOTE — ED ADULT TRIAGE NOTE - CCCP TRG CHIEF CMPLNT
FRONTAL VIEW CHEST:

 

Date:  12/05/18 

 

COMPARISON:  

09/16/17. 

 

INDICATION:

New onset chest pain. 

 

FINDINGS:

There is no evidence of lobar consolidation, effusion, or pneumothorax. There is a rounded hyperdensi
ty overlying the left upper abdomen, not further localized on the basis of this exam. Finding does co
rrespond to a calcification within the spleen on a prior CT from 2011. 

 

IMPRESSION: 

No focal consolidation. 

 

 

POS: [facility]
see chief complaint quote

## 2023-12-31 NOTE — ED PROVIDER NOTE - NSFOLLOWUPINSTRUCTIONS_ED_ALL_ED_FT
Numbness    - Please follow up with Dr. Bautista Darnell as outpatient for MRI of brain.   Call [phone] to schedule an appointment within the next 1-2 weeks. Office is located at [address] [place] [address], [place] [address].    - Please follow up with your primary care and cardiologist as indicated.  - Continue all medications as prescribed.    - Follow up with a GASTROENTEROLOGIST for blood in stool for repeat colonoscopy and endoscopy on discharge. Monitor for further bleeding.    Rest, drink plenty of fluids.  Advance activity as tolerated.  Continue all previously prescribed medications as directed.  Follow up with your primary care physician in 48-72 hours- bring copies of your results.  Return to the ER for worsening or persistent symptoms, and/or ANY NEW OR CONCERNING SYMPTOMS. Numbness    - Please follow up with Dr. Bautista Darnell as outpatient for MRI of brain.   Call [phone] to schedule an appointment within the next 1-2 weeks. Office is located at [address] [place] [address], [place] [address].    - Please follow up with your primary care and cardiologist as indicated.  - Continue all medications as prescribed.    - Follow up with a GASTROENTEROLOGIST for blood in stool for repeat colonoscopy and endoscopy on discharge. Monitor for further bleeding.    Rest, drink plenty of fluids.  Advance activity as tolerated.  Continue all previously prescribed medications as directed.  Follow up with your primary care physician in 48-72 hours- bring copies of your results.  Return to the ER for worsening or persistent symptoms, and/or ANY NEW OR CONCERNING SYMPTOMS.

## 2023-12-31 NOTE — ED ADULT NURSE NOTE - OBJECTIVE STATEMENT
69 y/o male coming to the ER with c/o numbness. A&Ox4. Ambulatory. PMH cardiac pacemaker, previous strokes on plavix and aspirin. Patient states at 7pm last night he began to have decreased sensation on the left side of the face as well as the left arm and leg. Patient endorses now the sensation feels the same in the b/l arms and legs, but endorses slightly decreased sensation in the left face. PERRL. Speech is clear. Patient denies chest pain, fever, chills, n/v/d, urinary symptoms, abdominal pain. 67 y/o male coming to the ER with c/o numbness. A&Ox4. Ambulatory. PMH cardiac pacemaker, previous strokes on plavix and aspirin. Patient states at 7pm last night he began to have decreased sensation on the left side of the face as well as the left arm and leg. Patient endorses now the sensation feels the same in the b/l arms and legs, but endorses slightly decreased sensation in the left face. Patient endorses "several days" of bloody stools. PERRL. Speech is clear. Patient denies chest pain, fever, chills, n/v/d, urinary symptoms, abdominal pain.

## 2023-12-31 NOTE — ED ADULT NURSE REASSESSMENT NOTE - NS ED NURSE REASSESS COMMENT FT1
Patient d/c. Reviewed d/c paperwork with patients, all questions answered at this time. Patient verbalizes understanding. IV removed. Patient instructed to return to the ER for any worsening s/s including chest pain, SOB, fever, n/v/d. Patient alert and stable at time of d/c. Patient will follow up with neurology, cardiology, and GI.

## 2023-12-31 NOTE — ED PROVIDER NOTE - CARE PLAN
1 Principal Discharge DX:	Numbness   Principal Discharge DX:	Numbness  Secondary Diagnosis:	Hematochezia

## 2023-12-31 NOTE — ED PROVIDER NOTE - CLINICAL SUMMARY MEDICAL DECISION MAKING FREE TEXT BOX
[place] PGY3: 69yo M with PMH of TIA, PFO, vertebral stenosis, CHB s/p PPM presents for L body numbness since 12/30 7pm. NIH 1 for sensory. Also endorsing arm pain and blood in stool while on thinners. CODE STROKE in triage. Plan for stroke protocol per neuro, neuro recs, labs including CBC and T&S. [place] PGY3: 67yo M with PMH of TIA, PFO, vertebral stenosis, CHB s/p PPM presents for L body numbness since 12/30 7pm. NIH 1 for sensory. Also endorsing arm pain and blood in stool while on thinners. CODE STROKE in triage. Plan for stroke protocol per neuro, neuro recs, labs including CBC and T&S. [place] PGY3: 69yo M with PMH of TIA, PFO, vertebral stenosis, CHB s/p PPM presents for L body numbness since 12/30 7pm. NIH 1 for sensory. Also endorsing arm pain. Blood in stool appears to be chronic concern and is known to his providers, check CBC. CODE STROKE in triage. Plan for stroke protocol per neuro, neuro recs, labs including CBC and T&S. IF Hbg not low, will dc with gi f/u for repeat EGD/colo.

## 2023-12-31 NOTE — ED PROVIDER NOTE - PROGRESS NOTE DETAILS
Attending MD Reveles: Neurology consultation is appreciated, CT CT angiogram unrevealing, they state no further urgent neurologic testing required at this time.  We will consult cardiology to interrogate patient's pacemaker to exclude any episodes of subclinical A-fib as part of patient's stroke workup today.  Currently on dual antiplatelet therapy and would advise patient to continue.  Patient's complaints of hematochezia are chronic in nature, his hemoglobin is 15 which argues against acute lower GI bleeding.  Patient does not require any further urgent workup for this issue, advised to follow-up as outpatient for management of this. Attending MD Reveles: Discussed case with cardiology fellow, interrogation shows no episodes of atrial fibrillation.  No other dysrhythmic events. Attending MD Reveles: Discussed case with cardiology fellow, interrogation shows no episodes of atrial fibrillation.  No other dysrhythmic events.     Patient at this time is optimized from a secondary stroke prevention standpoint.  Continue dual antiplatelet therapy statin, elevated blood pressure today however neurology notation recommends gradual normotension after 24 to 48 hours of permissive hypertension.  Patient was screened for atrial fibrillation with interrogation of his pacemaker and there was no evidence of atrial fibrillation.  Fortunately no evidence of any new large vessel disease on neuroimaging. Attending MD Reveles: discussed with neurology resident, patient can be discharged with follow up with Dr Darnell.

## 2023-12-31 NOTE — ED PROVIDER NOTE - CARE PROVIDER_API CALL
Bautista Darnell.  Neurology  [address] [facility] [address], [address]  [place] [address]  Phone: [phone]  Fax: [phone]  Follow Up Time:    Bautista Darnell.  Neurology  [address] [facility], [address]  [place] [address]  Phone: [phone]  Fax: [phone]  Follow Up Time:

## 2023-12-31 NOTE — ED ADULT NURSE NOTE - NSFALLUNIVINTERV_ED_ALL_ED
Bed/Stretcher in lowest position, wheels locked, appropriate side rails in place/Call bell, personal items and telephone in reach/Instruct patient to call for assistance before getting out of bed/chair/stretcher/Non-slip footwear applied when patient is off stretcher/[place] to call system/Physically safe environment - no spills, clutter or unnecessary equipment/Purposeful proactive rounding/Room/bathroom lighting operational, light cord in reach Bed/Stretcher in lowest position, wheels locked, appropriate side rails in place/Call bell, personal items and telephone in reach/Instruct patient to call for assistance before getting out of bed/chair/stretcher/Non-slip footwear applied when patient is off stretcher/[place] to call system/Physically safe environment - no spills, clutter or unnecessary equipment/Purposeful proactive rounding/Room/bathroom lighting operational, light cord in reach

## 2023-12-31 NOTE — PROCEDURE NOTE - ADDITIONAL PROCEDURE DETAILS
No VT/VF episodes detected  No arrythmia logged to explain patient's transient neurological deficits No VT/VF episodes detected  No arrhythmia logged to explain patient's transient neurological deficits

## 2023-12-31 NOTE — ED PROVIDER NOTE - OTHER FINDINGS
ECG recorded at 729 independently interpreted by me, Dr Johan Reveles, shows a sensed V paced right bundle branch block morphology normal intervals otherwise no diagnostic ischemic findings.

## 2024-01-04 ENCOUNTER — NON-APPOINTMENT (OUTPATIENT)
Age: 69
End: 2024-01-04

## 2024-01-04 ENCOUNTER — APPOINTMENT (OUTPATIENT)
Dept: ELECTROPHYSIOLOGY | Facility: CLINIC | Age: 69
End: 2024-01-04
Payer: COMMERCIAL

## 2024-01-05 PROCEDURE — 93294 REM INTERROG EVL PM/LDLS PM: CPT

## 2024-01-05 PROCEDURE — 93296 REM INTERROG EVL PM/IDS: CPT

## 2024-04-01 ENCOUNTER — NON-APPOINTMENT (OUTPATIENT)
Age: 69
End: 2024-04-01

## 2024-04-01 ENCOUNTER — APPOINTMENT (OUTPATIENT)
Dept: ELECTROPHYSIOLOGY | Facility: CLINIC | Age: 69
End: 2024-04-01

## 2024-04-01 VITALS
HEIGHT: 65 IN | WEIGHT: 180 LBS | SYSTOLIC BLOOD PRESSURE: 124 MMHG | DIASTOLIC BLOOD PRESSURE: 76 MMHG | OXYGEN SATURATION: 99 % | BODY MASS INDEX: 29.99 KG/M2 | HEART RATE: 67 BPM

## 2024-04-01 PROCEDURE — 93280 PM DEVICE PROGR EVAL DUAL: CPT

## 2024-04-01 PROCEDURE — 93000 ELECTROCARDIOGRAM COMPLETE: CPT | Mod: 59

## 2024-06-30 ENCOUNTER — NON-APPOINTMENT (OUTPATIENT)
Age: 69
End: 2024-06-30

## 2024-07-01 ENCOUNTER — APPOINTMENT (OUTPATIENT)
Dept: ELECTROPHYSIOLOGY | Facility: CLINIC | Age: 69
End: 2024-07-01
Payer: COMMERCIAL

## 2024-07-01 PROCEDURE — 93294 REM INTERROG EVL PM/LDLS PM: CPT

## 2024-07-01 PROCEDURE — 93296 REM INTERROG EVL PM/IDS: CPT

## 2024-07-17 ENCOUNTER — RX RENEWAL (OUTPATIENT)
Age: 69
End: 2024-07-17

## 2024-08-01 ENCOUNTER — LABORATORY RESULT (OUTPATIENT)
Age: 69
End: 2024-08-01

## 2024-08-01 ENCOUNTER — APPOINTMENT (OUTPATIENT)
Dept: INTERNAL MEDICINE | Facility: CLINIC | Age: 69
End: 2024-08-01
Payer: MEDICARE

## 2024-08-01 VITALS
TEMPERATURE: 97.8 F | OXYGEN SATURATION: 97 % | SYSTOLIC BLOOD PRESSURE: 123 MMHG | HEART RATE: 68 BPM | HEIGHT: 65 IN | DIASTOLIC BLOOD PRESSURE: 77 MMHG | BODY MASS INDEX: 29.49 KG/M2 | WEIGHT: 177 LBS

## 2024-08-01 DIAGNOSIS — D69.6 THROMBOCYTOPENIA, UNSPECIFIED: ICD-10-CM

## 2024-08-01 DIAGNOSIS — I65.09 OCCLUSION AND STENOSIS OF UNSPECIFIED VERTEBRAL ARTERY: ICD-10-CM

## 2024-08-01 DIAGNOSIS — R79.89 OTHER SPECIFIED ABNORMAL FINDINGS OF BLOOD CHEMISTRY: ICD-10-CM

## 2024-08-01 DIAGNOSIS — E78.5 HYPERLIPIDEMIA, UNSPECIFIED: ICD-10-CM

## 2024-08-01 PROCEDURE — 36415 COLL VENOUS BLD VENIPUNCTURE: CPT

## 2024-08-01 PROCEDURE — G0439: CPT

## 2024-08-03 NOTE — ASSESSMENT
[FreeTextEntry1] : Work done today, will check lipid panel vitamin B12 levels.  Check CBC given the history of thrombocytopenia.  Will check creatinine kinase given the leg fatigue but neurologic exam within normal limits.  There are no radicular symptoms.

## 2024-08-03 NOTE — REASON FOR VISIT
[Annual Wellness Visit] : an annual wellness visit [FreeTextEntry1] : Subsequent Medicare annual's visit

## 2024-08-03 NOTE — HISTORY OF PRESENT ILLNESS
[FreeTextEntry1] : Patient presents for subsequent Medicare wellness visit [de-identified] : Currently feels well, has no acute concerns.  Denies any chest pain shortness of breath abdominal pain denies any urinary incontinence.  Denies any epistaxis easy bruising.  Has leg fatigue, denies any weakness numbness.  Denies any swelling of the joints back pain.

## 2024-08-03 NOTE — HEALTH RISK ASSESSMENT
[Good] : ~his/her~  mood as  good [FreeTextEntry1] : health maintenance [No] : No [0] : 2) Feeling down, depressed, or hopeless: Not at all (0) [PHQ-2 Negative - No further assessment needed] : PHQ-2 Negative - No further assessment needed [[id number]] : 0 [Never] : Never [Change in mental status noted] : No change in mental status noted [Language] : denies difficulty with language [Behavior] : denies difficulty with behavior [Learning/Retaining New Information] : denies difficulty learning/retaining new information [Handling Complex Tasks] : denies difficulty handling complex tasks [Reasoning] : denies difficulty with reasoning [Spatial Ability and Orientation] : denies difficulty with spatial ability and orientation [None] : None [With Family] : lives with family [Fully functional (bathing, dressing, toileting, transferring, walking, feeding)] : Fully functional (bathing, dressing, toileting, transferring, walking, feeding) [Fully functional (using the telephone, shopping, preparing meals, housekeeping, doing laundry, using] : Fully functional and needs no help or supervision to perform IADLs (using the telephone, shopping, preparing meals, housekeeping, doing laundry, using transportation, managing medications and managing finances) [Reports changes in hearing] : Reports no changes in hearing [Reports changes in vision] : Reports no changes in vision [Reports normal functional visual acuity (ie: able to read med bottle)] : Reports normal functional visual acuity [Reports changes in dental health] : Reports no changes in dental health [Smoke Detector] : smoke detector [Carbon Monoxide Detector] : carbon monoxide detector [Safety elements used in home] : safety elements used in home [Seat Belt] :  uses seat belt [Sunscreen] : uses sunscreen [Travel to Developing Areas] : does not  travel to developing areas [TB Exposure] : is not being exposed to tuberculosis [Caregiver Concerns] : does not have caregiver concerns [AdvancecareDate] : 8/24

## 2024-08-06 ENCOUNTER — NON-APPOINTMENT (OUTPATIENT)
Age: 69
End: 2024-08-06

## 2024-08-06 LAB
25(OH)D3 SERPL-MCNC: 27.8 NG/ML
ALBUMIN SERPL ELPH-MCNC: 4.6 G/DL
ALDOLASE SERPL-CCNC: 4.5 U/L
ALP BLD-CCNC: 83 U/L
ALT SERPL-CCNC: 13 U/L
ANION GAP SERPL CALC-SCNC: 13 MMOL/L
APPEARANCE: CLEAR
AST SERPL-CCNC: 16 U/L
BASOPHILS # BLD AUTO: 0.07 K/UL
BASOPHILS NFR BLD AUTO: 1 %
BILIRUB SERPL-MCNC: 0.9 MG/DL
BILIRUBIN URINE: ABNORMAL
BLOOD URINE: NEGATIVE
BUN SERPL-MCNC: 19 MG/DL
CALCIUM SERPL-MCNC: 9.4 MG/DL
CHLORIDE SERPL-SCNC: 106 MMOL/L
CHOLEST SERPL-MCNC: 148 MG/DL
CK SERPL-CCNC: 109 U/L
CO2 SERPL-SCNC: 25 MMOL/L
COLOR: NORMAL
CREAT SERPL-MCNC: 1.3 MG/DL
EGFR: 60 ML/MIN/1.73M2
EOSINOPHIL # BLD AUTO: 0.11 K/UL
EOSINOPHIL NFR BLD AUTO: 1.6 %
ESTIMATED AVERAGE GLUCOSE: 123 MG/DL
GLUCOSE QUALITATIVE U: NEGATIVE MG/DL
GLUCOSE SERPL-MCNC: 94 MG/DL
HBA1C MFR BLD HPLC: 5.9 %
HCT VFR BLD CALC: 44.8 %
HDLC SERPL-MCNC: 49 MG/DL
HGB BLD-MCNC: 14.5 G/DL
IMM GRANULOCYTES NFR BLD AUTO: 0.3 %
KETONES URINE: NEGATIVE MG/DL
LDLC SERPL CALC-MCNC: 76 MG/DL
LEUKOCYTE ESTERASE URINE: NEGATIVE
LYMPHOCYTES # BLD AUTO: 2.22 K/UL
LYMPHOCYTES NFR BLD AUTO: 33.2 %
MAN DIFF?: NORMAL
MCHC RBC-ENTMCNC: 26.9 PG
MCHC RBC-ENTMCNC: 32.4 GM/DL
MCV RBC AUTO: 83.1 FL
MONOCYTES # BLD AUTO: 0.48 K/UL
MONOCYTES NFR BLD AUTO: 7.2 %
NEUTROPHILS # BLD AUTO: 3.79 K/UL
NEUTROPHILS NFR BLD AUTO: 56.7 %
NITRITE URINE: NEGATIVE
NONHDLC SERPL-MCNC: 99 MG/DL
PH URINE: 5.5
PLATELET # BLD AUTO: 157 K/UL
POTASSIUM SERPL-SCNC: 4.5 MMOL/L
PROT SERPL-MCNC: 7.3 G/DL
PROTEIN URINE: 30 MG/DL
RBC # BLD: 5.39 M/UL
RBC # FLD: 15.9 %
SODIUM SERPL-SCNC: 144 MMOL/L
SPECIFIC GRAVITY URINE: >1.03
TRIGL SERPL-MCNC: 134 MG/DL
TSH SERPL-ACNC: 0.68 UIU/ML
UROBILINOGEN URINE: 1 MG/DL
VIT B12 SERPL-MCNC: 338 PG/ML
WBC # FLD AUTO: 6.69 K/UL

## 2024-09-23 ENCOUNTER — NON-APPOINTMENT (OUTPATIENT)
Age: 69
End: 2024-09-23

## 2024-09-23 ENCOUNTER — APPOINTMENT (OUTPATIENT)
Dept: INTERNAL MEDICINE | Facility: CLINIC | Age: 69
End: 2024-09-23
Payer: MEDICARE

## 2024-09-23 VITALS
OXYGEN SATURATION: 97 % | HEIGHT: 65 IN | SYSTOLIC BLOOD PRESSURE: 134 MMHG | HEART RATE: 68 BPM | DIASTOLIC BLOOD PRESSURE: 86 MMHG | WEIGHT: 183 LBS | BODY MASS INDEX: 30.49 KG/M2 | TEMPERATURE: 98.2 F

## 2024-09-23 PROCEDURE — G2211 COMPLEX E/M VISIT ADD ON: CPT

## 2024-09-23 PROCEDURE — 99213 OFFICE O/P EST LOW 20 MIN: CPT

## 2024-09-25 NOTE — HISTORY OF PRESENT ILLNESS
[FreeTextEntry8] : Patient presents to the office had an MRI of the cervical spine incidental finding of enlarged thyroid, denies any difficulty swallowing difficulty breathing dysphagia odynophagia.

## 2024-09-25 NOTE — ASSESSMENT
[FreeTextEntry1] : No nodules palpated on exam, patient has no symptoms given the findings ultrasound of the thyroid ordered.

## 2024-09-29 ENCOUNTER — NON-APPOINTMENT (OUTPATIENT)
Age: 69
End: 2024-09-29

## 2024-09-30 ENCOUNTER — APPOINTMENT (OUTPATIENT)
Dept: ELECTROPHYSIOLOGY | Facility: CLINIC | Age: 69
End: 2024-09-30
Payer: MEDICARE

## 2024-09-30 PROCEDURE — 93294 REM INTERROG EVL PM/LDLS PM: CPT

## 2024-09-30 PROCEDURE — 93296 REM INTERROG EVL PM/IDS: CPT

## 2024-10-01 DIAGNOSIS — E04.1 NONTOXIC SINGLE THYROID NODULE: ICD-10-CM

## 2024-10-03 ENCOUNTER — NON-APPOINTMENT (OUTPATIENT)
Age: 69
End: 2024-10-03

## 2024-10-15 ENCOUNTER — RESULT REVIEW (OUTPATIENT)
Age: 69
End: 2024-10-15

## 2024-10-15 ENCOUNTER — APPOINTMENT (OUTPATIENT)
Dept: SURGERY | Facility: CLINIC | Age: 69
End: 2024-10-15
Payer: MEDICARE

## 2024-10-15 DIAGNOSIS — E04.1 NONTOXIC SINGLE THYROID NODULE: ICD-10-CM

## 2024-10-15 PROCEDURE — 99204 OFFICE O/P NEW MOD 45 MIN: CPT

## 2024-10-22 ENCOUNTER — OUTPATIENT (OUTPATIENT)
Dept: OUTPATIENT SERVICES | Facility: HOSPITAL | Age: 69
LOS: 1 days | End: 2024-10-22
Payer: MEDICARE

## 2024-10-22 ENCOUNTER — APPOINTMENT (OUTPATIENT)
Dept: ULTRASOUND IMAGING | Facility: IMAGING CENTER | Age: 69
End: 2024-10-22
Payer: MEDICARE

## 2024-10-22 ENCOUNTER — RESULT REVIEW (OUTPATIENT)
Age: 69
End: 2024-10-22

## 2024-10-22 DIAGNOSIS — E04.1 NONTOXIC SINGLE THYROID NODULE: ICD-10-CM

## 2024-10-22 PROCEDURE — 88173 CYTOPATH EVAL FNA REPORT: CPT | Mod: 26

## 2024-10-22 PROCEDURE — 10005 FNA BX W/US GDN 1ST LES: CPT

## 2024-10-23 LAB — NON-GYNECOLOGICAL CYTOLOGY STUDY: SIGNIFICANT CHANGE UP

## 2024-10-29 DIAGNOSIS — E04.1 NONTOXIC SINGLE THYROID NODULE: ICD-10-CM

## 2024-11-20 PROCEDURE — 88172 CYTP DX EVAL FNA 1ST EA SITE: CPT

## 2024-11-20 PROCEDURE — 10005 FNA BX W/US GDN 1ST LES: CPT

## 2024-11-20 PROCEDURE — 88173 CYTOPATH EVAL FNA REPORT: CPT

## 2025-01-03 ENCOUNTER — NON-APPOINTMENT (OUTPATIENT)
Age: 70
End: 2025-01-03

## 2025-01-03 ENCOUNTER — APPOINTMENT (OUTPATIENT)
Dept: ELECTROPHYSIOLOGY | Facility: CLINIC | Age: 70
End: 2025-01-03
Payer: MEDICARE

## 2025-01-03 PROCEDURE — 93294 REM INTERROG EVL PM/LDLS PM: CPT

## 2025-01-03 PROCEDURE — 93296 REM INTERROG EVL PM/IDS: CPT

## 2025-04-01 ENCOUNTER — APPOINTMENT (OUTPATIENT)
Dept: ELECTROPHYSIOLOGY | Facility: CLINIC | Age: 70
End: 2025-04-01
Payer: MEDICARE

## 2025-04-01 ENCOUNTER — RESULT CHARGE (OUTPATIENT)
Age: 70
End: 2025-04-01

## 2025-04-01 ENCOUNTER — NON-APPOINTMENT (OUTPATIENT)
Age: 70
End: 2025-04-01

## 2025-04-01 VITALS
WEIGHT: 180 LBS | OXYGEN SATURATION: 98 % | SYSTOLIC BLOOD PRESSURE: 135 MMHG | HEART RATE: 60 BPM | BODY MASS INDEX: 29.95 KG/M2 | DIASTOLIC BLOOD PRESSURE: 80 MMHG

## 2025-04-01 PROCEDURE — 93280 PM DEVICE PROGR EVAL DUAL: CPT

## 2025-04-01 PROCEDURE — 93000 ELECTROCARDIOGRAM COMPLETE: CPT | Mod: XU

## 2025-06-02 ENCOUNTER — RX RENEWAL (OUTPATIENT)
Age: 70
End: 2025-06-02

## 2025-06-09 ENCOUNTER — APPOINTMENT (OUTPATIENT)
Dept: INTERNAL MEDICINE | Facility: CLINIC | Age: 70
End: 2025-06-09
Payer: MEDICARE

## 2025-06-09 ENCOUNTER — LABORATORY RESULT (OUTPATIENT)
Age: 70
End: 2025-06-09

## 2025-06-09 VITALS
HEART RATE: 68 BPM | HEIGHT: 65 IN | DIASTOLIC BLOOD PRESSURE: 71 MMHG | OXYGEN SATURATION: 97 % | SYSTOLIC BLOOD PRESSURE: 115 MMHG | TEMPERATURE: 98.2 F

## 2025-06-09 PROCEDURE — 99213 OFFICE O/P EST LOW 20 MIN: CPT

## 2025-06-09 PROCEDURE — G2211 COMPLEX E/M VISIT ADD ON: CPT

## 2025-06-09 PROCEDURE — 36415 COLL VENOUS BLD VENIPUNCTURE: CPT

## 2025-06-17 LAB
ALBUMIN SERPL ELPH-MCNC: 4.6 G/DL
ALP BLD-CCNC: 85 U/L
ALT SERPL-CCNC: 18 U/L
ANION GAP SERPL CALC-SCNC: 14 MMOL/L
APPEARANCE: CLEAR
AST SERPL-CCNC: 23 U/L
BILIRUB SERPL-MCNC: 1.6 MG/DL
BILIRUBIN URINE: NEGATIVE
BLOOD URINE: NEGATIVE
BUN SERPL-MCNC: 13 MG/DL
CALCIUM SERPL-MCNC: 9.5 MG/DL
CHLORIDE SERPL-SCNC: 105 MMOL/L
CHOLEST SERPL-MCNC: 140 MG/DL
CO2 SERPL-SCNC: 24 MMOL/L
COLOR: YELLOW
CREAT SERPL-MCNC: 1.13 MG/DL
EGFRCR SERPLBLD CKD-EPI 2021: 70 ML/MIN/1.73M2
ESTIMATED AVERAGE GLUCOSE: 134 MG/DL
GLUCOSE QUALITATIVE U: NEGATIVE MG/DL
GLUCOSE SERPL-MCNC: 69 MG/DL
HBA1C MFR BLD HPLC: 6.3 %
HCT VFR BLD CALC: 44.6 %
HDLC SERPL-MCNC: 53 MG/DL
HGB BLD-MCNC: 14.5 G/DL
KETONES URINE: NEGATIVE MG/DL
LDLC SERPL-MCNC: 63 MG/DL
LEUKOCYTE ESTERASE URINE: ABNORMAL
MCHC RBC-ENTMCNC: 26.8 PG
MCHC RBC-ENTMCNC: 32.5 G/DL
MCV RBC AUTO: 82.4 FL
NITRITE URINE: NEGATIVE
NONHDLC SERPL-MCNC: 87 MG/DL
PH URINE: 6.5
PLATELET # BLD AUTO: 166 K/UL
POTASSIUM SERPL-SCNC: 4.8 MMOL/L
PROT SERPL-MCNC: 7.2 G/DL
PROTEIN URINE: NEGATIVE MG/DL
PSA SERPL-MCNC: 1.93 NG/ML
RBC # BLD: 5.41 M/UL
RBC # FLD: 16.5 %
SODIUM SERPL-SCNC: 143 MMOL/L
SPECIFIC GRAVITY URINE: 1.02
T4 FREE SERPL-MCNC: 1.2 NG/DL
TRIGL SERPL-MCNC: 138 MG/DL
TSH SERPL-ACNC: 0.63 UIU/ML
UROBILINOGEN URINE: 0.2 MG/DL
WBC # FLD AUTO: 8.36 K/UL

## 2025-06-25 ENCOUNTER — APPOINTMENT (OUTPATIENT)
Dept: INTERNAL MEDICINE | Facility: CLINIC | Age: 70
End: 2025-06-25
Payer: MEDICARE

## 2025-06-25 PROCEDURE — 36415 COLL VENOUS BLD VENIPUNCTURE: CPT

## 2025-07-01 ENCOUNTER — APPOINTMENT (OUTPATIENT)
Dept: ELECTROPHYSIOLOGY | Facility: CLINIC | Age: 70
End: 2025-07-01

## 2025-07-01 ENCOUNTER — NON-APPOINTMENT (OUTPATIENT)
Age: 70
End: 2025-07-01

## 2025-07-01 PROCEDURE — 93296 REM INTERROG EVL PM/IDS: CPT

## 2025-07-01 PROCEDURE — 93294 REM INTERROG EVL PM/LDLS PM: CPT

## 2025-07-02 LAB
ALBUMIN SERPL ELPH-MCNC: 4.5 G/DL
ALP BLD-CCNC: 81 U/L
ALT SERPL-CCNC: 20 U/L
AST SERPL-CCNC: 24 U/L
BILIRUB DIRECT SERPL-MCNC: 0.47 MG/DL
BILIRUB INDIRECT SERPL-MCNC: 1.3 MG/DL
BILIRUB SERPL-MCNC: 1.8 MG/DL
HAPTOGLOB SERPL-MCNC: 49 MG/DL
HCT VFR BLD CALC: 45.7 %
HGB BLD-MCNC: 14.9 G/DL
LDH SERPL-CCNC: 204 U/L
MCHC RBC-ENTMCNC: 27 PG
MCHC RBC-ENTMCNC: 32.6 G/DL
MCV RBC AUTO: 82.9 FL
PLATELET # BLD AUTO: 152 K/UL
PROT SERPL-MCNC: 7 G/DL
RBC # BLD: 5.51 M/UL
RBC # FLD: 17.2 %
WBC # FLD AUTO: 6.04 K/UL

## 2025-07-07 ENCOUNTER — RX RENEWAL (OUTPATIENT)
Age: 70
End: 2025-07-07

## 2025-07-24 DIAGNOSIS — R17 UNSPECIFIED JAUNDICE: ICD-10-CM
